# Patient Record
Sex: MALE | Race: WHITE | URBAN - METROPOLITAN AREA
[De-identification: names, ages, dates, MRNs, and addresses within clinical notes are randomized per-mention and may not be internally consistent; named-entity substitution may affect disease eponyms.]

---

## 2019-07-23 ENCOUNTER — OUTPATIENT (OUTPATIENT)
Dept: OUTPATIENT SERVICES | Facility: HOSPITAL | Age: 59
LOS: 1 days | Discharge: HOME | End: 2019-07-23

## 2019-07-23 DIAGNOSIS — Z98.890 OTHER SPECIFIED POSTPROCEDURAL STATES: Chronic | ICD-10-CM

## 2019-07-24 DIAGNOSIS — G47.33 OBSTRUCTIVE SLEEP APNEA (ADULT) (PEDIATRIC): ICD-10-CM

## 2019-09-16 ENCOUNTER — EMERGENCY (EMERGENCY)
Facility: HOSPITAL | Age: 59
LOS: 0 days | Discharge: HOME | End: 2019-09-16
Attending: EMERGENCY MEDICINE | Admitting: EMERGENCY MEDICINE
Payer: COMMERCIAL

## 2019-09-16 VITALS
OXYGEN SATURATION: 98 % | TEMPERATURE: 99 F | RESPIRATION RATE: 18 BRPM | WEIGHT: 265 LBS | HEART RATE: 72 BPM | SYSTOLIC BLOOD PRESSURE: 136 MMHG | DIASTOLIC BLOOD PRESSURE: 66 MMHG

## 2019-09-16 DIAGNOSIS — Z98.890 OTHER SPECIFIED POSTPROCEDURAL STATES: Chronic | ICD-10-CM

## 2019-09-16 DIAGNOSIS — R11.0 NAUSEA: ICD-10-CM

## 2019-09-16 DIAGNOSIS — R10.32 LEFT LOWER QUADRANT PAIN: ICD-10-CM

## 2019-09-16 DIAGNOSIS — R10.9 UNSPECIFIED ABDOMINAL PAIN: ICD-10-CM

## 2019-09-16 LAB
ALBUMIN SERPL ELPH-MCNC: 4.6 G/DL — SIGNIFICANT CHANGE UP (ref 3.5–5.2)
ALP SERPL-CCNC: 80 U/L — SIGNIFICANT CHANGE UP (ref 30–115)
ALT FLD-CCNC: 20 U/L — SIGNIFICANT CHANGE UP (ref 0–41)
ANION GAP SERPL CALC-SCNC: 16 MMOL/L — HIGH (ref 7–14)
APPEARANCE UR: CLEAR — SIGNIFICANT CHANGE UP
AST SERPL-CCNC: 25 U/L — SIGNIFICANT CHANGE UP (ref 0–41)
BACTERIA # UR AUTO: ABNORMAL
BASOPHILS # BLD AUTO: 0.04 K/UL — SIGNIFICANT CHANGE UP (ref 0–0.2)
BASOPHILS NFR BLD AUTO: 0.5 % — SIGNIFICANT CHANGE UP (ref 0–1)
BILIRUB SERPL-MCNC: 0.8 MG/DL — SIGNIFICANT CHANGE UP (ref 0.2–1.2)
BILIRUB UR-MCNC: ABNORMAL
BUN SERPL-MCNC: 15 MG/DL — SIGNIFICANT CHANGE UP (ref 10–20)
CALCIUM SERPL-MCNC: 10.1 MG/DL — SIGNIFICANT CHANGE UP (ref 8.5–10.1)
CHLORIDE SERPL-SCNC: 102 MMOL/L — SIGNIFICANT CHANGE UP (ref 98–110)
CO2 SERPL-SCNC: 23 MMOL/L — SIGNIFICANT CHANGE UP (ref 17–32)
COD CRY URNS QL: ABNORMAL
COLOR SPEC: SIGNIFICANT CHANGE UP
COMMENT - URINE: SIGNIFICANT CHANGE UP
CREAT SERPL-MCNC: 1.6 MG/DL — HIGH (ref 0.7–1.5)
DIFF PNL FLD: NEGATIVE — SIGNIFICANT CHANGE UP
EOSINOPHIL # BLD AUTO: 0.08 K/UL — SIGNIFICANT CHANGE UP (ref 0–0.7)
EOSINOPHIL NFR BLD AUTO: 1 % — SIGNIFICANT CHANGE UP (ref 0–8)
EPI CELLS # UR: ABNORMAL /HPF
GLUCOSE SERPL-MCNC: 92 MG/DL — SIGNIFICANT CHANGE UP (ref 70–99)
GLUCOSE UR QL: NEGATIVE MG/DL — SIGNIFICANT CHANGE UP
HCT VFR BLD CALC: 46 % — SIGNIFICANT CHANGE UP (ref 42–52)
HGB BLD-MCNC: 15.2 G/DL — SIGNIFICANT CHANGE UP (ref 14–18)
HYALINE CASTS # UR AUTO: ABNORMAL /LPF
IMM GRANULOCYTES NFR BLD AUTO: 0.2 % — SIGNIFICANT CHANGE UP (ref 0.1–0.3)
KETONES UR-MCNC: 160
LEUKOCYTE ESTERASE UR-ACNC: ABNORMAL
LYMPHOCYTES # BLD AUTO: 1.82 K/UL — SIGNIFICANT CHANGE UP (ref 1.2–3.4)
LYMPHOCYTES # BLD AUTO: 21.8 % — SIGNIFICANT CHANGE UP (ref 20.5–51.1)
MCHC RBC-ENTMCNC: 29.7 PG — SIGNIFICANT CHANGE UP (ref 27–31)
MCHC RBC-ENTMCNC: 33 G/DL — SIGNIFICANT CHANGE UP (ref 32–37)
MCV RBC AUTO: 90 FL — SIGNIFICANT CHANGE UP (ref 80–94)
MONOCYTES # BLD AUTO: 0.53 K/UL — SIGNIFICANT CHANGE UP (ref 0.1–0.6)
MONOCYTES NFR BLD AUTO: 6.4 % — SIGNIFICANT CHANGE UP (ref 1.7–9.3)
NEUTROPHILS # BLD AUTO: 5.85 K/UL — SIGNIFICANT CHANGE UP (ref 1.4–6.5)
NEUTROPHILS NFR BLD AUTO: 70.1 % — SIGNIFICANT CHANGE UP (ref 42.2–75.2)
NITRITE UR-MCNC: NEGATIVE — SIGNIFICANT CHANGE UP
NRBC # BLD: 0 /100 WBCS — SIGNIFICANT CHANGE UP (ref 0–0)
PH UR: 5.5 — SIGNIFICANT CHANGE UP (ref 5–8)
PLATELET # BLD AUTO: 285 K/UL — SIGNIFICANT CHANGE UP (ref 130–400)
POTASSIUM SERPL-MCNC: 4.1 MMOL/L — SIGNIFICANT CHANGE UP (ref 3.5–5)
POTASSIUM SERPL-SCNC: 4.1 MMOL/L — SIGNIFICANT CHANGE UP (ref 3.5–5)
PROT SERPL-MCNC: 7.3 G/DL — SIGNIFICANT CHANGE UP (ref 6–8)
PROT UR-MCNC: 100 MG/DL
RBC # BLD: 5.11 M/UL — SIGNIFICANT CHANGE UP (ref 4.7–6.1)
RBC # FLD: 12.2 % — SIGNIFICANT CHANGE UP (ref 11.5–14.5)
RBC CASTS # UR COMP ASSIST: SIGNIFICANT CHANGE UP /HPF
SODIUM SERPL-SCNC: 141 MMOL/L — SIGNIFICANT CHANGE UP (ref 135–146)
SP GR SPEC: >=1.03 (ref 1.01–1.03)
UROBILINOGEN FLD QL: 1 MG/DL (ref 0.2–0.2)
WBC # BLD: 8.34 K/UL — SIGNIFICANT CHANGE UP (ref 4.8–10.8)
WBC # FLD AUTO: 8.34 K/UL — SIGNIFICANT CHANGE UP (ref 4.8–10.8)
WBC UR QL: SIGNIFICANT CHANGE UP /HPF

## 2019-09-16 PROCEDURE — 74177 CT ABD & PELVIS W/CONTRAST: CPT | Mod: 26

## 2019-09-16 PROCEDURE — 99285 EMERGENCY DEPT VISIT HI MDM: CPT

## 2019-09-16 RX ORDER — SIMVASTATIN 20 MG/1
1 TABLET, FILM COATED ORAL
Qty: 0 | Refills: 0 | DISCHARGE

## 2019-09-16 RX ORDER — SODIUM CHLORIDE 9 MG/ML
1000 INJECTION INTRAMUSCULAR; INTRAVENOUS; SUBCUTANEOUS
Refills: 0 | Status: DISCONTINUED | OUTPATIENT
Start: 2019-09-16 | End: 2019-09-16

## 2019-09-16 RX ORDER — ONDANSETRON 8 MG/1
4 TABLET, FILM COATED ORAL ONCE
Refills: 0 | Status: COMPLETED | OUTPATIENT
Start: 2019-09-16 | End: 2019-09-16

## 2019-09-16 RX ORDER — MORPHINE SULFATE 50 MG/1
4 CAPSULE, EXTENDED RELEASE ORAL ONCE
Refills: 0 | Status: DISCONTINUED | OUTPATIENT
Start: 2019-09-16 | End: 2019-09-16

## 2019-09-16 RX ORDER — SODIUM CHLORIDE 9 MG/ML
1000 INJECTION INTRAMUSCULAR; INTRAVENOUS; SUBCUTANEOUS ONCE
Refills: 0 | Status: COMPLETED | OUTPATIENT
Start: 2019-09-16 | End: 2019-09-16

## 2019-09-16 RX ADMIN — MORPHINE SULFATE 4 MILLIGRAM(S): 50 CAPSULE, EXTENDED RELEASE ORAL at 17:36

## 2019-09-16 RX ADMIN — ONDANSETRON 4 MILLIGRAM(S): 8 TABLET, FILM COATED ORAL at 17:36

## 2019-09-16 RX ADMIN — SODIUM CHLORIDE 1000 MILLILITER(S): 9 INJECTION INTRAMUSCULAR; INTRAVENOUS; SUBCUTANEOUS at 18:48

## 2019-09-16 RX ADMIN — MORPHINE SULFATE 4 MILLIGRAM(S): 50 CAPSULE, EXTENDED RELEASE ORAL at 18:45

## 2019-09-16 RX ADMIN — MORPHINE SULFATE 4 MILLIGRAM(S): 50 CAPSULE, EXTENDED RELEASE ORAL at 19:52

## 2019-09-16 RX ADMIN — SODIUM CHLORIDE 200 MILLILITER(S): 9 INJECTION INTRAMUSCULAR; INTRAVENOUS; SUBCUTANEOUS at 19:52

## 2019-09-16 RX ADMIN — SODIUM CHLORIDE 1000 MILLILITER(S): 9 INJECTION INTRAMUSCULAR; INTRAVENOUS; SUBCUTANEOUS at 19:54

## 2019-09-16 NOTE — ED PROVIDER NOTE - PROGRESS NOTE DETAILS
60 y/o M PMH noted presents with abdominal pain x1 month. Pt completed a course of Cipro and Flagyl 2 weeks ago, which helped resolved the pain but pain returned. Pt started 2nd course of Cipro and Flagyl on Friday, as well as a liquid diet. Pt has had NBNB watery stools. Pt states he has chills and nausea today. Pt denies fever, CP, vomiting, and urinary symptoms. On exam pt in NAD, AAO x3, PERRL, EOMI, no lad, neck supple, OP clear, MMM, Lungs CTA B/L, no wrr, no mur, Abd is soft, + BS, (+) TTP LLQ  ND, no edema, good ROM x all ext, no rash, steady gait results discussed will dc

## 2019-09-16 NOTE — ED PROVIDER NOTE - PATIENT PORTAL LINK FT
You can access the FollowMyHealth Patient Portal offered by [facility] by registering at the following website: [email or web address]. By joining [employer]’s FollowMyHealth portal, you will also be able to view your health information using other applications (apps) compatible with our system.

## 2019-09-16 NOTE — ED PROVIDER NOTE - CLINICAL SUMMARY MEDICAL DECISION MAKING FREE TEXT BOX
Results reviewed and d/w patient and family.  Pt with uncomplicated diverticulitis, afebrile, nml WBC count.  In my opinion he can cont abx ads prescribed by GI, will need to follow p ith GI , Pt will return if any worsening symptoms or concerns.

## 2019-09-16 NOTE — ED PROVIDER NOTE - ATTENDING CONTRIBUTION TO CARE
60 y/o M PMH noted presents with abdominal pain x1 month. Pt completed a course of Cipro and Flagyl 2 weeks ago, which resolved the pain but pain returned. Pt started 2nd course of Cipro and Flagyl on Friday, as well as a liquid diet after being seen by his GI.  Pt scheduled for CT tomorrow, but fet like he could not wait.  Pt has been having loose stools. Pt states he has chills and nausea today. Pt denies fever, CP, vomiting, and urinary symptoms. On exam pt in NAD, AAO x3, PERRL, no lad, neck supple, OP clear,  Lungs CTA B/L, no wrr, Abd is soft, + BS, (+) TTP LLQ  ND, no edema, good ROM x all ext, no rash,

## 2019-09-16 NOTE — ED PROVIDER NOTE - NSFOLLOWUPINSTRUCTIONS_ED_ALL_ED_FT
Patient to be discharged from ED. Any available test results were discussed with patient and/or family. Verbal instructions given, including instructions to return to ED immediately for any new, worsening, or concerning symptoms. Patient endorsed understanding. Written discharge instructions additionally given, including follow-up plan.    Diverticulitis    Diverticulitis is inflammation or infection of small pouches in your colon that form when you HAVE a condition called diverticulosis. This condition can range from mild to severe potentially leading to perforation or obstructions of your colon. Symptoms include abdominal pain, fever/chills, nausea, vomiting, diarrhea, constipation, or blood in your stool. If you were prescribed an antibiotic medicine, take it as told by your health care provider. Do not stop taking the antibiotic even if you start to feel better.    SEEK IMMEDIATE MEDICAL CARE IF YOU HAVE ANY OF THE FOLLOWING SYMPTOMS: worsening abdominal pain, high fever, inability to hold down liquids or medication, black or bloody stools, inability to pass gas, lightheadedness/dizziness, or a change in mental status.

## 2019-09-16 NOTE — ED PROVIDER NOTE - OBJECTIVE STATEMENT
this is 58 yo male presents to ed for evaluation of abdominal pain. patient with history of diverticulitis. patient states has episode in august and was treated. patient states this started again

## 2019-09-16 NOTE — ED PROVIDER NOTE - NS ED ROS FT
Review of Systems:  	•	CONSTITUTIONAL - no fever, no diaphoresis, no chills  	•	SKIN - no rash  	•	HEMATOLOGIC - no bleeding, no bruising  	•	EYES - no eye pain, no blurry vision  	•	ENT - no change in hearing, no sore throat, no ear pain or tinnitus  	•	RESPIRATORY - no shortness of breath, no cough  	•	CARDIAC - no chest pain, no palpitations  	•	GI - abd pain, nausea, no vomiting, no diarrhea, no constipation  	•	GENITO-URINARY - no discharge, no dysuria; no hematuria, no increased urinary frequency  	•	MUSCULOSKELETAL - no joint paint, no swelling, no redness  	•	NEUROLOGIC - no weakness, no headache, no paresthesias, no LOC  	•	PSYCH - no anxiety, non suicidal, non homicidal, no hallucination, no depression

## 2019-09-16 NOTE — ED PROVIDER NOTE - PHYSICAL EXAMINATION
--EXAM--  VITAL SIGNS: I have reviewed vs documented at present.  CONSTITUTIONAL: Well-developed; well-nourished; in no acute distress.   SKIN: Warm and dry, no acute rash.   HEAD: Normocephalic; atraumatic.  EYES: PERRL, EOM intact; conjunctiva and sclera clear. No nystagmus.  ENT: No nasal discharge; airway clear.  NECK: Supple; non tender.  CARD: S1, S2, Regular rate and rhythm.   RESP: No wheezes, rales or rhonchi.  ABD: Normal bowel sounds; soft; non-distended; tenderness LLQ  EXT: Normal ROM.   NEURO: Alert, oriented, grossly unremarkable. Strength 5/5 in all extremities. Sensation intact throughout.  PSYCH: Cooperative, appropriate.

## 2019-09-16 NOTE — ED PROVIDER NOTE - CARE PROVIDER_API CALL
Moody Hager)  Gastroenterology; Internal Medicine  [address] [place] [address]  Phone: [phone]  Fax: [phone]  Follow Up Time:

## 2019-11-03 ENCOUNTER — TRANSCRIPTION ENCOUNTER (OUTPATIENT)
Age: 59
End: 2019-11-03

## 2021-11-03 ENCOUNTER — EMERGENCY (EMERGENCY)
Facility: HOSPITAL | Age: 61
LOS: 0 days | Discharge: HOME | End: 2021-11-04
Attending: EMERGENCY MEDICINE | Admitting: EMERGENCY MEDICINE
Payer: COMMERCIAL

## 2021-11-03 VITALS
HEART RATE: 70 BPM | SYSTOLIC BLOOD PRESSURE: 183 MMHG | TEMPERATURE: 99 F | OXYGEN SATURATION: 100 % | DIASTOLIC BLOOD PRESSURE: 96 MMHG | HEIGHT: 74 IN | RESPIRATION RATE: 20 BRPM | WEIGHT: 250 LBS

## 2021-11-03 DIAGNOSIS — R10.32 LEFT LOWER QUADRANT PAIN: ICD-10-CM

## 2021-11-03 DIAGNOSIS — R10.9 UNSPECIFIED ABDOMINAL PAIN: ICD-10-CM

## 2021-11-03 DIAGNOSIS — K62.5 HEMORRHAGE OF ANUS AND RECTUM: ICD-10-CM

## 2021-11-03 DIAGNOSIS — E78.5 HYPERLIPIDEMIA, UNSPECIFIED: ICD-10-CM

## 2021-11-03 DIAGNOSIS — Z98.890 OTHER SPECIFIED POSTPROCEDURAL STATES: Chronic | ICD-10-CM

## 2021-11-03 DIAGNOSIS — R00.1 BRADYCARDIA, UNSPECIFIED: ICD-10-CM

## 2021-11-03 DIAGNOSIS — Z20.822 CONTACT WITH AND (SUSPECTED) EXPOSURE TO COVID-19: ICD-10-CM

## 2021-11-03 DIAGNOSIS — I10 ESSENTIAL (PRIMARY) HYPERTENSION: ICD-10-CM

## 2021-11-03 LAB
ALBUMIN SERPL ELPH-MCNC: 4.5 G/DL — SIGNIFICANT CHANGE UP (ref 3.5–5.2)
ALP SERPL-CCNC: 86 U/L — SIGNIFICANT CHANGE UP (ref 30–115)
ALT FLD-CCNC: 40 U/L — SIGNIFICANT CHANGE UP (ref 0–41)
ANION GAP SERPL CALC-SCNC: 11 MMOL/L — SIGNIFICANT CHANGE UP (ref 7–14)
APPEARANCE UR: CLEAR — SIGNIFICANT CHANGE UP
AST SERPL-CCNC: 26 U/L — SIGNIFICANT CHANGE UP (ref 0–41)
BASOPHILS # BLD AUTO: 0.08 K/UL — SIGNIFICANT CHANGE UP (ref 0–0.2)
BASOPHILS NFR BLD AUTO: 0.8 % — SIGNIFICANT CHANGE UP (ref 0–1)
BILIRUB DIRECT SERPL-MCNC: <0.2 MG/DL — SIGNIFICANT CHANGE UP (ref 0–0.2)
BILIRUB INDIRECT FLD-MCNC: >0.3 MG/DL — SIGNIFICANT CHANGE UP (ref 0.2–1.2)
BILIRUB SERPL-MCNC: 0.5 MG/DL — SIGNIFICANT CHANGE UP (ref 0.2–1.2)
BILIRUB UR-MCNC: NEGATIVE — SIGNIFICANT CHANGE UP
BLD GP AB SCN SERPL QL: SIGNIFICANT CHANGE UP
BUN SERPL-MCNC: 19 MG/DL — SIGNIFICANT CHANGE UP (ref 10–20)
CALCIUM SERPL-MCNC: 9.4 MG/DL — SIGNIFICANT CHANGE UP (ref 8.5–10.1)
CHLORIDE SERPL-SCNC: 103 MMOL/L — SIGNIFICANT CHANGE UP (ref 98–110)
CO2 SERPL-SCNC: 26 MMOL/L — SIGNIFICANT CHANGE UP (ref 17–32)
COLOR SPEC: YELLOW — SIGNIFICANT CHANGE UP
CREAT SERPL-MCNC: 1.2 MG/DL — SIGNIFICANT CHANGE UP (ref 0.7–1.5)
DIFF PNL FLD: NEGATIVE — SIGNIFICANT CHANGE UP
EOSINOPHIL # BLD AUTO: 0.3 K/UL — SIGNIFICANT CHANGE UP (ref 0–0.7)
EOSINOPHIL NFR BLD AUTO: 3.2 % — SIGNIFICANT CHANGE UP (ref 0–8)
GLUCOSE SERPL-MCNC: 85 MG/DL — SIGNIFICANT CHANGE UP (ref 70–99)
GLUCOSE UR QL: NEGATIVE MG/DL — SIGNIFICANT CHANGE UP
HCT VFR BLD CALC: 44.4 % — SIGNIFICANT CHANGE UP (ref 42–52)
HGB BLD-MCNC: 14.6 G/DL — SIGNIFICANT CHANGE UP (ref 14–18)
IMM GRANULOCYTES NFR BLD AUTO: 0.1 % — SIGNIFICANT CHANGE UP (ref 0.1–0.3)
KETONES UR-MCNC: NEGATIVE — SIGNIFICANT CHANGE UP
LEUKOCYTE ESTERASE UR-ACNC: NEGATIVE — SIGNIFICANT CHANGE UP
LIDOCAIN IGE QN: 48 U/L — SIGNIFICANT CHANGE UP (ref 7–60)
LYMPHOCYTES # BLD AUTO: 3.17 K/UL — SIGNIFICANT CHANGE UP (ref 1.2–3.4)
LYMPHOCYTES # BLD AUTO: 33.5 % — SIGNIFICANT CHANGE UP (ref 20.5–51.1)
MCHC RBC-ENTMCNC: 30.2 PG — SIGNIFICANT CHANGE UP (ref 27–31)
MCHC RBC-ENTMCNC: 32.9 G/DL — SIGNIFICANT CHANGE UP (ref 32–37)
MCV RBC AUTO: 91.9 FL — SIGNIFICANT CHANGE UP (ref 80–94)
MONOCYTES # BLD AUTO: 0.74 K/UL — HIGH (ref 0.1–0.6)
MONOCYTES NFR BLD AUTO: 7.8 % — SIGNIFICANT CHANGE UP (ref 1.7–9.3)
NEUTROPHILS # BLD AUTO: 5.15 K/UL — SIGNIFICANT CHANGE UP (ref 1.4–6.5)
NEUTROPHILS NFR BLD AUTO: 54.6 % — SIGNIFICANT CHANGE UP (ref 42.2–75.2)
NITRITE UR-MCNC: NEGATIVE — SIGNIFICANT CHANGE UP
NRBC # BLD: 0 /100 WBCS — SIGNIFICANT CHANGE UP (ref 0–0)
PH UR: 6.5 — SIGNIFICANT CHANGE UP (ref 5–8)
PLATELET # BLD AUTO: 267 K/UL — SIGNIFICANT CHANGE UP (ref 130–400)
POTASSIUM SERPL-MCNC: 4 MMOL/L — SIGNIFICANT CHANGE UP (ref 3.5–5)
POTASSIUM SERPL-SCNC: 4 MMOL/L — SIGNIFICANT CHANGE UP (ref 3.5–5)
PROT SERPL-MCNC: 6.8 G/DL — SIGNIFICANT CHANGE UP (ref 6–8)
PROT UR-MCNC: NEGATIVE MG/DL — SIGNIFICANT CHANGE UP
RBC # BLD: 4.83 M/UL — SIGNIFICANT CHANGE UP (ref 4.7–6.1)
RBC # FLD: 12.7 % — SIGNIFICANT CHANGE UP (ref 11.5–14.5)
SARS-COV-2 RNA SPEC QL NAA+PROBE: SIGNIFICANT CHANGE UP
SODIUM SERPL-SCNC: 140 MMOL/L — SIGNIFICANT CHANGE UP (ref 135–146)
SP GR SPEC: 1.02 — SIGNIFICANT CHANGE UP (ref 1.01–1.03)
UROBILINOGEN FLD QL: 0.2 MG/DL — SIGNIFICANT CHANGE UP
WBC # BLD: 9.45 K/UL — SIGNIFICANT CHANGE UP (ref 4.8–10.8)
WBC # FLD AUTO: 9.45 K/UL — SIGNIFICANT CHANGE UP (ref 4.8–10.8)

## 2021-11-03 PROCEDURE — 99285 EMERGENCY DEPT VISIT HI MDM: CPT

## 2021-11-03 PROCEDURE — 71045 X-RAY EXAM CHEST 1 VIEW: CPT | Mod: 26

## 2021-11-03 PROCEDURE — 93010 ELECTROCARDIOGRAM REPORT: CPT

## 2021-11-03 RX ORDER — ONDANSETRON 8 MG/1
4 TABLET, FILM COATED ORAL ONCE
Refills: 0 | Status: COMPLETED | OUTPATIENT
Start: 2021-11-03 | End: 2021-11-03

## 2021-11-03 RX ORDER — IOHEXOL 300 MG/ML
30 INJECTION, SOLUTION INTRAVENOUS ONCE
Refills: 0 | Status: COMPLETED | OUTPATIENT
Start: 2021-11-03 | End: 2021-11-03

## 2021-11-03 RX ORDER — SODIUM CHLORIDE 9 MG/ML
1000 INJECTION INTRAMUSCULAR; INTRAVENOUS; SUBCUTANEOUS ONCE
Refills: 0 | Status: COMPLETED | OUTPATIENT
Start: 2021-11-03 | End: 2021-11-03

## 2021-11-03 RX ORDER — MORPHINE SULFATE 50 MG/1
6 CAPSULE, EXTENDED RELEASE ORAL ONCE
Refills: 0 | Status: DISCONTINUED | OUTPATIENT
Start: 2021-11-03 | End: 2021-11-03

## 2021-11-03 RX ADMIN — MORPHINE SULFATE 6 MILLIGRAM(S): 50 CAPSULE, EXTENDED RELEASE ORAL at 22:02

## 2021-11-03 RX ADMIN — ONDANSETRON 4 MILLIGRAM(S): 8 TABLET, FILM COATED ORAL at 22:02

## 2021-11-03 RX ADMIN — SODIUM CHLORIDE 1000 MILLILITER(S): 9 INJECTION INTRAMUSCULAR; INTRAVENOUS; SUBCUTANEOUS at 22:02

## 2021-11-03 RX ADMIN — IOHEXOL 30 MILLILITER(S): 300 INJECTION, SOLUTION INTRAVENOUS at 22:02

## 2021-11-03 NOTE — ED PROVIDER NOTE - ATTENDING CONTRIBUTION TO CARE
61yM diverticulitis p/w rectal bleeding - pt just finished abx 2d ago for diverticulitis and still having intermittent LLQ abd pain.  Pt presents today for rectal bleeding - toilet bowl w/ bright red blood tonight.  No fevers.  Not on a/c.

## 2021-11-03 NOTE — ED PROVIDER NOTE - OBJECTIVE STATEMENT
61 year old male past medical history of diverticulitis with resection and recent infection on amox and flagyl comes to emergency room for abdominal pain and rectal bleeding. patient sttes that he was having BM and after saw blood in the toliet and came to emergency room to make sure he didn't have infection again as he just finished abx.

## 2021-11-03 NOTE — ED PROVIDER NOTE - CLINICAL SUMMARY MEDICAL DECISION MAKING FREE TEXT BOX
61yM diverticular disease s/p resection p/w LLQ abd pain and rectal bleeding 2d after completing abx for diverticulitis.  H/H stable.  CT w/o acute pathology.  Pain treated and abd soft/benign on assessment.  Recommend supportive care, close o/p GI/gen surg f/u, return precautions.

## 2021-11-03 NOTE — ED PROVIDER NOTE - PROGRESS NOTE DETAILS
Pt with no further bloody BM. Results d/w patient and family and copies of results provided.  Pt instructed to return if any worsening symptoms or concerns.  Discussed with patient follow up and care plan. They verbalize understanding all discussed and all questions answered..

## 2021-11-03 NOTE — ED ADULT TRIAGE NOTE - CHIEF COMPLAINT QUOTE
I have diverticulitis for several years.  I just finished amoxicillin and metronidazole and today I had a whole bowl of blood when I went to the bathroom.

## 2021-11-03 NOTE — ED PROVIDER NOTE - PHYSICAL EXAMINATION
Physical Exam    Vital Signs: I have reviewed the initial vital signs.  Constitutional: well-nourished, appears stated age, no acute distress  Eyes: Conjunctiva pink, Sclera clear, PERRLA, EOMI.  Cardiovascular: S1 and S2, regular rate, regular rhythm, well-perfused extremities, radial pulses equal and 2+  Respiratory: unlabored respiratory effort, clear to auscultation bilaterally no wheezing, rales and rhonchi  Gastrointestinal: soft, LLQ Tenderness , no pulsatile mass, normal bowl sounds  Musculoskeletal: supple neck, no lower extremity edema, no midline tenderness  Integumentary: warm, dry, no rash  Neurologic: awake, alert, cranial nerves II-XII grossly intact, extremities’ motor and sensory functions grossly intact  Psychiatric: appropriate mood, appropriate affect

## 2021-11-03 NOTE — ED PROVIDER NOTE - NSFOLLOWUPINSTRUCTIONS_ED_ALL_ED_FT
Follow up with your primary care doctor and your GI doctor in 1-2 days     Acute Abdominal Pain    WHAT YOU NEED TO KNOW:    The cause of your abdominal pain may not be found. If a cause is found, treatment will depend on what the cause is.     DISCHARGE INSTRUCTIONS:    Return to the emergency department if:     You vomit blood or cannot stop vomiting.      You have blood in your bowel movement or it looks like tar.       You have bleeding from your rectum.       Your abdomen is larger than usual, more painful, and hard.       You have severe pain in your abdomen.       You stop passing gas and having bowel movements.       You feel weak, dizzy, or faint.    Contact your healthcare provider if:     You have a fever.      You have new signs and symptoms.      Your symptoms do not get better with treatment.       You have questions or concerns about your condition or care.    Medicines may be given to decrease pain, treat an infection, and manage your symptoms. Take your medicine as directed. Call your healthcare provider if you think your medicine is not helping or if you have side effects. Tell him if you are allergic to any medicine. Keep a list of the medicines, vitamins, and herbs you take. Include the amounts, and when and why you take them. Bring the list or the pill bottles to follow-up visits. Carry your medicine list with you in case of an emergency.    Manage your symptoms:     Apply heat on your abdomen for 20 to 30 minutes every 2 hours for as many days as directed. Heat helps decrease pain and muscle spasms.       Manage your stress. Stress may cause abdominal pain. Your healthcare provider may recommend relaxation techniques and deep breathing exercises to help decrease your stress. Your healthcare provider may recommend you talk to someone about your stress or anxiety, such as a counselor or a trusted friend. Get plenty of sleep and exercise regularly.       Limit or do not drink alcohol. Alcohol can make your abdominal pain worse. Ask your healthcare provider if it is safe for you to drink alcohol. Also ask how much is safe for you to drink.       Do not smoke. Nicotine and other chemicals in cigarettes can damage your esophagus and stomach. Ask your healthcare provider for information if you currently smoke and need help to quit. E-cigarettes or smokeless tobacco still contain nicotine. Talk to your healthcare provider before you use these products.     Make changes to the food you eat as directed: Do not eat foods that cause abdominal pain or other symptoms. Eat small meals more often.     Eat more high-fiber foods if you are constipated. High-fiber foods include fruits, vegetables, whole-grain foods, and legumes.       Do not eat foods that cause gas if you have bloating. Examples include broccoli, cabbage, and cauliflower. Do not drink soda or carbonated drinks, because these may also cause gas.       Do not eat foods or drinks that contain sorbitol or fructose if you have diarrhea and bloating. Some examples are fruit juices, candy, jelly, and sugar-free gum.       Do not eat high-fat foods, such as fried foods, cheeseburgers, hot dogs, and desserts.      Limit or do not drink caffeine. Caffeine may make symptoms, such as heart burn or nausea, worse.       Drink plenty of liquids to prevent dehydration from diarrhea or vomiting. Ask your healthcare provider how much liquid to drink each day and which liquids are best for you.     Follow up with your healthcare provider as directed: Write down your questions so you remember to ask them during your visits.       © Copyright [employer] 2019 All illustrations and images included in CareNotes are the copyrighted property of [employer] or [employer]       Rectal Bleeding    WHAT YOU NEED TO KNOW:    Rectal bleeding can be caused by constipation, hemorrhoids, or anal fissures. It may also be caused by polyps, tumors, or medical conditions, such as colitis or diverticulitis.    DISCHARGE INSTRUCTIONS:    Medicines:     Pain medicine: You may be given medicine to take away or decrease pain. Do not wait until the pain is severe before you take your medicine.      Iron supplement: Iron helps your body make more red blood cells.       Steroids: This medicine decreases inflammation in your rectum. It may be applied as a cream, ointment, or lotion.      Take your medicine as directed. Contact your healthcare provider if you think your medicine is not helping or if you have side effects. Tell him of her if you are allergic to any medicine. Keep a list of the medicines, vitamins, and herbs you take. Include the amounts, and when and why you take them. Bring the list or the pill bottles to follow-up visits. Carry your medicine list with you in case of an emergency.    Follow up with your healthcare provider as directed: Write down your questions so you remember to ask them during your visits.     Drink liquids as directed: Ask your healthcare provider how much liquid to drink each day and which liquids are best for you. This will help prevent dehydration and constipation.    Contact your healthcare provider if:     You have a fever.      Your rectal bleeding stopped for a time, but has started again.       You have nausea.      You have cold, sweaty, pale skin.      You have changes in your bowel movements, such as diarrhea.      You have questions or concerns about your condition or care.    Return to the emergency department if:     You are breathing faster than usual.      You are dizzy, lightheaded, or feel faint.      You are confused or cannot think clearly.      You urinate less than usual or not at all.      Your rectal bleeding is constant or heavy.      You have severe abdominal pain or cramping.         © Copyright [employer] 2019 All illustrations and images included in CareNotes are the copyrighted property of [employer] or [employer].

## 2021-11-03 NOTE — ED PROVIDER NOTE - PATIENT PORTAL LINK FT
You can access the FollowMyHealth Patient Portal offered by [facility] by registering at the following website: [email or web address]. By joining [employer]’s FollowMyHealth portal, you will also be able to view your health information using other applications (apps) compatible with our system.

## 2021-11-04 ENCOUNTER — INPATIENT (INPATIENT)
Facility: HOSPITAL | Age: 61
LOS: 6 days | Discharge: HOME | End: 2021-11-11
Attending: STUDENT IN AN ORGANIZED HEALTH CARE EDUCATION/TRAINING PROGRAM | Admitting: STUDENT IN AN ORGANIZED HEALTH CARE EDUCATION/TRAINING PROGRAM
Payer: COMMERCIAL

## 2021-11-04 VITALS
RESPIRATION RATE: 18 BRPM | HEART RATE: 66 BPM | SYSTOLIC BLOOD PRESSURE: 107 MMHG | DIASTOLIC BLOOD PRESSURE: 55 MMHG | HEIGHT: 74 IN | TEMPERATURE: 98 F | OXYGEN SATURATION: 100 % | WEIGHT: 279.99 LBS

## 2021-11-04 DIAGNOSIS — Z98.890 OTHER SPECIFIED POSTPROCEDURAL STATES: Chronic | ICD-10-CM

## 2021-11-04 PROCEDURE — 99291 CRITICAL CARE FIRST HOUR: CPT

## 2021-11-04 PROCEDURE — 99292 CRITICAL CARE ADDL 30 MIN: CPT

## 2021-11-04 PROCEDURE — 74177 CT ABD & PELVIS W/CONTRAST: CPT | Mod: 26,MA

## 2021-11-04 NOTE — ED ADULT NURSE NOTE - ED STAT RN HANDOFF DETAILS
Phone report given to EMS team, pt aaox3, respirations even , easy and nonlabored. Family at bedside.  1st unit PRBCs completed, 2nd unit actively transfusing with no s/s of infiltrate. report given to rn on unit

## 2021-11-04 NOTE — ED ADULT NURSE NOTE - NSFALLRSKINDICATORS_ED_ALL_ED
lumbosacral radiculopathy (I.e., primarily L5/S1 myotomal distribution) with mild ongoing and chronic denervation of mostly a moderately severe degree. 2.  There is no evidence of a chronic large fiber sensorimotor peripheral polyneuropathy. 3.  A small fiber neuropathy cannot be assessed by means of electrodiagnostic testing. 4.  Recommended to be completed includes a lumbar spine MRI study for evaluation of lumbar degenerative disc disease, lumbar spinal stenosis. Clinical correlation is recommended. Prior EMG study: None. Technologist:   Physician: Pat Gauthier MD    Nerve conduction studies and electromyography were performed according to our laboratory policies and procedures which can be provided upon request. All abnormal values are identified in the table.  Laboratory normal values can also be provided upon request.       Cc: MD Edward Chaudhry MD
no

## 2021-11-05 ENCOUNTER — TRANSCRIPTION ENCOUNTER (OUTPATIENT)
Age: 61
End: 2021-11-05

## 2021-11-05 DIAGNOSIS — Z90.49 ACQUIRED ABSENCE OF OTHER SPECIFIED PARTS OF DIGESTIVE TRACT: Chronic | ICD-10-CM

## 2021-11-05 LAB
ALBUMIN SERPL ELPH-MCNC: 3 G/DL — LOW (ref 3.5–5.2)
ALBUMIN SERPL ELPH-MCNC: 3.7 G/DL — SIGNIFICANT CHANGE UP (ref 3.5–5.2)
ALP SERPL-CCNC: 55 U/L — SIGNIFICANT CHANGE UP (ref 30–115)
ALP SERPL-CCNC: 77 U/L — SIGNIFICANT CHANGE UP (ref 30–115)
ALT FLD-CCNC: 21 U/L — SIGNIFICANT CHANGE UP (ref 0–41)
ALT FLD-CCNC: 33 U/L — SIGNIFICANT CHANGE UP (ref 0–41)
ANION GAP SERPL CALC-SCNC: 14 MMOL/L — SIGNIFICANT CHANGE UP (ref 7–14)
ANION GAP SERPL CALC-SCNC: 9 MMOL/L — SIGNIFICANT CHANGE UP (ref 7–14)
APTT BLD: 26.2 SEC — LOW (ref 27–39.2)
APTT BLD: 28.9 SEC — SIGNIFICANT CHANGE UP (ref 27–39.2)
AST SERPL-CCNC: 15 U/L — SIGNIFICANT CHANGE UP (ref 0–41)
AST SERPL-CCNC: 21 U/L — SIGNIFICANT CHANGE UP (ref 0–41)
BASOPHILS # BLD AUTO: 0.04 K/UL — SIGNIFICANT CHANGE UP (ref 0–0.2)
BASOPHILS # BLD AUTO: 0.05 K/UL — SIGNIFICANT CHANGE UP (ref 0–0.2)
BASOPHILS # BLD AUTO: 0.07 K/UL — SIGNIFICANT CHANGE UP (ref 0–0.2)
BASOPHILS # BLD AUTO: 0.09 K/UL — SIGNIFICANT CHANGE UP (ref 0–0.2)
BASOPHILS NFR BLD AUTO: 0.3 % — SIGNIFICANT CHANGE UP (ref 0–1)
BASOPHILS NFR BLD AUTO: 0.4 % — SIGNIFICANT CHANGE UP (ref 0–1)
BASOPHILS NFR BLD AUTO: 0.5 % — SIGNIFICANT CHANGE UP (ref 0–1)
BASOPHILS NFR BLD AUTO: 0.9 % — SIGNIFICANT CHANGE UP (ref 0–1)
BILIRUB SERPL-MCNC: 0.5 MG/DL — SIGNIFICANT CHANGE UP (ref 0.2–1.2)
BILIRUB SERPL-MCNC: 1.1 MG/DL — SIGNIFICANT CHANGE UP (ref 0.2–1.2)
BLD GP AB SCN SERPL QL: SIGNIFICANT CHANGE UP
BLD GP AB SCN SERPL QL: SIGNIFICANT CHANGE UP
BUN SERPL-MCNC: 16 MG/DL — SIGNIFICANT CHANGE UP (ref 10–20)
BUN SERPL-MCNC: 17 MG/DL — SIGNIFICANT CHANGE UP (ref 10–20)
CALCIUM SERPL-MCNC: 7.8 MG/DL — LOW (ref 8.5–10.1)
CALCIUM SERPL-MCNC: 9.1 MG/DL — SIGNIFICANT CHANGE UP (ref 8.5–10.1)
CHLORIDE SERPL-SCNC: 105 MMOL/L — SIGNIFICANT CHANGE UP (ref 98–110)
CHLORIDE SERPL-SCNC: 113 MMOL/L — HIGH (ref 98–110)
CO2 SERPL-SCNC: 17 MMOL/L — SIGNIFICANT CHANGE UP (ref 17–32)
CO2 SERPL-SCNC: 20 MMOL/L — SIGNIFICANT CHANGE UP (ref 17–32)
CREAT SERPL-MCNC: 1.1 MG/DL — SIGNIFICANT CHANGE UP (ref 0.7–1.5)
CREAT SERPL-MCNC: 1.2 MG/DL — SIGNIFICANT CHANGE UP (ref 0.7–1.5)
EOSINOPHIL # BLD AUTO: 0.02 K/UL — SIGNIFICANT CHANGE UP (ref 0–0.7)
EOSINOPHIL # BLD AUTO: 0.04 K/UL — SIGNIFICANT CHANGE UP (ref 0–0.7)
EOSINOPHIL # BLD AUTO: 0.12 K/UL — SIGNIFICANT CHANGE UP (ref 0–0.7)
EOSINOPHIL # BLD AUTO: 0.16 K/UL — SIGNIFICANT CHANGE UP (ref 0–0.7)
EOSINOPHIL NFR BLD AUTO: 0.2 % — SIGNIFICANT CHANGE UP (ref 0–8)
EOSINOPHIL NFR BLD AUTO: 0.3 % — SIGNIFICANT CHANGE UP (ref 0–8)
EOSINOPHIL NFR BLD AUTO: 1.2 % — SIGNIFICANT CHANGE UP (ref 0–8)
EOSINOPHIL NFR BLD AUTO: 1.4 % — SIGNIFICANT CHANGE UP (ref 0–8)
GLUCOSE SERPL-MCNC: 113 MG/DL — HIGH (ref 70–99)
GLUCOSE SERPL-MCNC: 120 MG/DL — HIGH (ref 70–99)
HCT VFR BLD CALC: 30.7 % — LOW (ref 42–52)
HCT VFR BLD CALC: 32.7 % — LOW (ref 42–52)
HCT VFR BLD CALC: 37.8 % — LOW (ref 42–52)
HCT VFR BLD CALC: 40 % — LOW (ref 42–52)
HGB BLD-MCNC: 10.1 G/DL — LOW (ref 14–18)
HGB BLD-MCNC: 11.2 G/DL — LOW (ref 14–18)
HGB BLD-MCNC: 12.4 G/DL — LOW (ref 14–18)
HGB BLD-MCNC: 13.2 G/DL — LOW (ref 14–18)
IMM GRANULOCYTES NFR BLD AUTO: 0.4 % — HIGH (ref 0.1–0.3)
IMM GRANULOCYTES NFR BLD AUTO: 0.5 % — HIGH (ref 0.1–0.3)
IMM GRANULOCYTES NFR BLD AUTO: 0.6 % — HIGH (ref 0.1–0.3)
IMM GRANULOCYTES NFR BLD AUTO: 0.6 % — HIGH (ref 0.1–0.3)
INR BLD: 1.19 RATIO — SIGNIFICANT CHANGE UP (ref 0.65–1.3)
INR BLD: 1.25 RATIO — SIGNIFICANT CHANGE UP (ref 0.65–1.3)
LYMPHOCYTES # BLD AUTO: 15 % — LOW (ref 20.5–51.1)
LYMPHOCYTES # BLD AUTO: 17.7 % — LOW (ref 20.5–51.1)
LYMPHOCYTES # BLD AUTO: 2.13 K/UL — SIGNIFICANT CHANGE UP (ref 1.2–3.4)
LYMPHOCYTES # BLD AUTO: 2.17 K/UL — SIGNIFICANT CHANGE UP (ref 1.2–3.4)
LYMPHOCYTES # BLD AUTO: 2.23 K/UL — SIGNIFICANT CHANGE UP (ref 1.2–3.4)
LYMPHOCYTES # BLD AUTO: 2.98 K/UL — SIGNIFICANT CHANGE UP (ref 1.2–3.4)
LYMPHOCYTES # BLD AUTO: 20.6 % — SIGNIFICANT CHANGE UP (ref 20.5–51.1)
LYMPHOCYTES # BLD AUTO: 26.5 % — SIGNIFICANT CHANGE UP (ref 20.5–51.1)
MAGNESIUM SERPL-MCNC: 1.6 MG/DL — LOW (ref 1.8–2.4)
MCHC RBC-ENTMCNC: 30 PG — SIGNIFICANT CHANGE UP (ref 27–31)
MCHC RBC-ENTMCNC: 30.1 PG — SIGNIFICANT CHANGE UP (ref 27–31)
MCHC RBC-ENTMCNC: 30.3 PG — SIGNIFICANT CHANGE UP (ref 27–31)
MCHC RBC-ENTMCNC: 30.4 PG — SIGNIFICANT CHANGE UP (ref 27–31)
MCHC RBC-ENTMCNC: 32.8 G/DL — SIGNIFICANT CHANGE UP (ref 32–37)
MCHC RBC-ENTMCNC: 32.9 G/DL — SIGNIFICANT CHANGE UP (ref 32–37)
MCHC RBC-ENTMCNC: 33 G/DL — SIGNIFICANT CHANGE UP (ref 32–37)
MCHC RBC-ENTMCNC: 34.3 G/DL — SIGNIFICANT CHANGE UP (ref 32–37)
MCV RBC AUTO: 87.9 FL — SIGNIFICANT CHANGE UP (ref 80–94)
MCV RBC AUTO: 91.3 FL — SIGNIFICANT CHANGE UP (ref 80–94)
MCV RBC AUTO: 92.2 FL — SIGNIFICANT CHANGE UP (ref 80–94)
MCV RBC AUTO: 92.2 FL — SIGNIFICANT CHANGE UP (ref 80–94)
MONOCYTES # BLD AUTO: 0.48 K/UL — SIGNIFICANT CHANGE UP (ref 0.1–0.6)
MONOCYTES # BLD AUTO: 0.69 K/UL — HIGH (ref 0.1–0.6)
MONOCYTES # BLD AUTO: 0.77 K/UL — HIGH (ref 0.1–0.6)
MONOCYTES # BLD AUTO: 0.79 K/UL — HIGH (ref 0.1–0.6)
MONOCYTES NFR BLD AUTO: 3.3 % — SIGNIFICANT CHANGE UP (ref 1.7–9.3)
MONOCYTES NFR BLD AUTO: 6.3 % — SIGNIFICANT CHANGE UP (ref 1.7–9.3)
MONOCYTES NFR BLD AUTO: 6.7 % — SIGNIFICANT CHANGE UP (ref 1.7–9.3)
MONOCYTES NFR BLD AUTO: 6.9 % — SIGNIFICANT CHANGE UP (ref 1.7–9.3)
NEUTROPHILS # BLD AUTO: 11.59 K/UL — HIGH (ref 1.4–6.5)
NEUTROPHILS # BLD AUTO: 7.22 K/UL — HIGH (ref 1.4–6.5)
NEUTROPHILS # BLD AUTO: 7.28 K/UL — HIGH (ref 1.4–6.5)
NEUTROPHILS # BLD AUTO: 9.46 K/UL — HIGH (ref 1.4–6.5)
NEUTROPHILS NFR BLD AUTO: 64.3 % — SIGNIFICANT CHANGE UP (ref 42.2–75.2)
NEUTROPHILS NFR BLD AUTO: 70.2 % — SIGNIFICANT CHANGE UP (ref 42.2–75.2)
NEUTROPHILS NFR BLD AUTO: 74.9 % — SIGNIFICANT CHANGE UP (ref 42.2–75.2)
NEUTROPHILS NFR BLD AUTO: 80.3 % — HIGH (ref 42.2–75.2)
NRBC # BLD: 0 /100 WBCS — SIGNIFICANT CHANGE UP (ref 0–0)
PLATELET # BLD AUTO: 168 K/UL — SIGNIFICANT CHANGE UP (ref 130–400)
PLATELET # BLD AUTO: 209 K/UL — SIGNIFICANT CHANGE UP (ref 130–400)
PLATELET # BLD AUTO: 267 K/UL — SIGNIFICANT CHANGE UP (ref 130–400)
PLATELET # BLD AUTO: 292 K/UL — SIGNIFICANT CHANGE UP (ref 130–400)
POTASSIUM SERPL-MCNC: 3.9 MMOL/L — SIGNIFICANT CHANGE UP (ref 3.5–5)
POTASSIUM SERPL-MCNC: 4.3 MMOL/L — SIGNIFICANT CHANGE UP (ref 3.5–5)
POTASSIUM SERPL-SCNC: 3.9 MMOL/L — SIGNIFICANT CHANGE UP (ref 3.5–5)
POTASSIUM SERPL-SCNC: 4.3 MMOL/L — SIGNIFICANT CHANGE UP (ref 3.5–5)
PROT SERPL-MCNC: 4.5 G/DL — LOW (ref 6–8)
PROT SERPL-MCNC: 5.9 G/DL — LOW (ref 6–8)
PROTHROM AB SERPL-ACNC: 13.7 SEC — HIGH (ref 9.95–12.87)
PROTHROM AB SERPL-ACNC: 14.4 SEC — HIGH (ref 9.95–12.87)
RBC # BLD: 3.33 M/UL — LOW (ref 4.7–6.1)
RBC # BLD: 3.72 M/UL — LOW (ref 4.7–6.1)
RBC # BLD: 4.14 M/UL — LOW (ref 4.7–6.1)
RBC # BLD: 4.34 M/UL — LOW (ref 4.7–6.1)
RBC # FLD: 12.7 % — SIGNIFICANT CHANGE UP (ref 11.5–14.5)
RBC # FLD: 13 % — SIGNIFICANT CHANGE UP (ref 11.5–14.5)
RBC # FLD: 14.6 % — HIGH (ref 11.5–14.5)
RBC # FLD: 15.3 % — HIGH (ref 11.5–14.5)
SARS-COV-2 RNA SPEC QL NAA+PROBE: SIGNIFICANT CHANGE UP
SODIUM SERPL-SCNC: 139 MMOL/L — SIGNIFICANT CHANGE UP (ref 135–146)
SODIUM SERPL-SCNC: 139 MMOL/L — SIGNIFICANT CHANGE UP (ref 135–146)
TROPONIN T SERPL-MCNC: <0.01 NG/ML — SIGNIFICANT CHANGE UP
WBC # BLD: 10.35 K/UL — SIGNIFICANT CHANGE UP (ref 4.8–10.8)
WBC # BLD: 11.24 K/UL — HIGH (ref 4.8–10.8)
WBC # BLD: 12.61 K/UL — HIGH (ref 4.8–10.8)
WBC # BLD: 14.43 K/UL — HIGH (ref 4.8–10.8)
WBC # FLD AUTO: 10.35 K/UL — SIGNIFICANT CHANGE UP (ref 4.8–10.8)
WBC # FLD AUTO: 11.24 K/UL — HIGH (ref 4.8–10.8)
WBC # FLD AUTO: 12.61 K/UL — HIGH (ref 4.8–10.8)
WBC # FLD AUTO: 14.43 K/UL — HIGH (ref 4.8–10.8)

## 2021-11-05 PROCEDURE — 74174 CTA ABD&PLVS W/CONTRAST: CPT | Mod: 26,MA

## 2021-11-05 PROCEDURE — 36247 INS CATH ABD/L-EXT ART 3RD: CPT

## 2021-11-05 PROCEDURE — 75726 ARTERY X-RAYS ABDOMEN: CPT | Mod: 26

## 2021-11-05 PROCEDURE — 99222 1ST HOSP IP/OBS MODERATE 55: CPT

## 2021-11-05 PROCEDURE — 75733 ARTERY X-RAYS ADRENALS: CPT | Mod: 26

## 2021-11-05 PROCEDURE — 99223 1ST HOSP IP/OBS HIGH 75: CPT

## 2021-11-05 PROCEDURE — 75774 ARTERY X-RAY EACH VESSEL: CPT | Mod: 26

## 2021-11-05 PROCEDURE — 76937 US GUIDE VASCULAR ACCESS: CPT | Mod: 26

## 2021-11-05 PROCEDURE — 93010 ELECTROCARDIOGRAM REPORT: CPT

## 2021-11-05 RX ORDER — CALCIUM GLUCONATE 100 MG/ML
1 VIAL (ML) INTRAVENOUS ONCE
Refills: 0 | Status: COMPLETED | OUTPATIENT
Start: 2021-11-05 | End: 2021-11-05

## 2021-11-05 RX ORDER — SERTRALINE 25 MG/1
100 TABLET, FILM COATED ORAL DAILY
Refills: 0 | Status: DISCONTINUED | OUTPATIENT
Start: 2021-11-05 | End: 2021-11-11

## 2021-11-05 RX ORDER — SODIUM CHLORIDE 9 MG/ML
1000 INJECTION, SOLUTION INTRAVENOUS ONCE
Refills: 0 | Status: COMPLETED | OUTPATIENT
Start: 2021-11-05 | End: 2021-11-05

## 2021-11-05 RX ORDER — ONDANSETRON 8 MG/1
4 TABLET, FILM COATED ORAL EVERY 6 HOURS
Refills: 0 | Status: DISCONTINUED | OUTPATIENT
Start: 2021-11-05 | End: 2021-11-08

## 2021-11-05 RX ORDER — CHLORHEXIDINE GLUCONATE 213 G/1000ML
1 SOLUTION TOPICAL
Refills: 0 | Status: DISCONTINUED | OUTPATIENT
Start: 2021-11-05 | End: 2021-11-11

## 2021-11-05 RX ORDER — AMLODIPINE BESYLATE 2.5 MG/1
1 TABLET ORAL
Qty: 0 | Refills: 0 | DISCHARGE

## 2021-11-05 RX ORDER — SIMVASTATIN 20 MG/1
20 TABLET, FILM COATED ORAL AT BEDTIME
Refills: 0 | Status: DISCONTINUED | OUTPATIENT
Start: 2021-11-05 | End: 2021-11-11

## 2021-11-05 RX ORDER — ACETAMINOPHEN 500 MG
650 TABLET ORAL EVERY 6 HOURS
Refills: 0 | Status: DISCONTINUED | OUTPATIENT
Start: 2021-11-05 | End: 2021-11-11

## 2021-11-05 RX ORDER — PIPERACILLIN AND TAZOBACTAM 4; .5 G/20ML; G/20ML
3.38 INJECTION, POWDER, LYOPHILIZED, FOR SOLUTION INTRAVENOUS ONCE
Refills: 0 | Status: DISCONTINUED | OUTPATIENT
Start: 2021-11-05 | End: 2021-11-05

## 2021-11-05 RX ORDER — MULTIVIT WITH MIN/MFOLATE/K2 340-15/3 G
1 POWDER (GRAM) ORAL ONCE
Refills: 0 | Status: COMPLETED | OUTPATIENT
Start: 2021-11-05 | End: 2021-11-05

## 2021-11-05 RX ORDER — OXYCODONE AND ACETAMINOPHEN 5; 325 MG/1; MG/1
2 TABLET ORAL EVERY 6 HOURS
Refills: 0 | Status: DISCONTINUED | OUTPATIENT
Start: 2021-11-05 | End: 2021-11-11

## 2021-11-05 RX ORDER — MAGNESIUM SULFATE 500 MG/ML
2 VIAL (ML) INJECTION ONCE
Refills: 0 | Status: COMPLETED | OUTPATIENT
Start: 2021-11-05 | End: 2021-11-05

## 2021-11-05 RX ORDER — SODIUM CHLORIDE 9 MG/ML
1000 INJECTION, SOLUTION INTRAVENOUS
Refills: 0 | Status: DISCONTINUED | OUTPATIENT
Start: 2021-11-05 | End: 2021-11-07

## 2021-11-05 RX ORDER — PIPERACILLIN AND TAZOBACTAM 4; .5 G/20ML; G/20ML
3.38 INJECTION, POWDER, LYOPHILIZED, FOR SOLUTION INTRAVENOUS EVERY 8 HOURS
Refills: 0 | Status: DISCONTINUED | OUTPATIENT
Start: 2021-11-05 | End: 2021-11-05

## 2021-11-05 RX ORDER — INFLUENZA VIRUS VACCINE 15; 15; 15; 15 UG/.5ML; UG/.5ML; UG/.5ML; UG/.5ML
0.5 SUSPENSION INTRAMUSCULAR ONCE
Refills: 0 | Status: DISCONTINUED | OUTPATIENT
Start: 2021-11-05 | End: 2021-11-11

## 2021-11-05 RX ORDER — SODIUM CHLORIDE 9 MG/ML
1000 INJECTION INTRAMUSCULAR; INTRAVENOUS; SUBCUTANEOUS
Refills: 0 | Status: DISCONTINUED | OUTPATIENT
Start: 2021-11-05 | End: 2021-11-05

## 2021-11-05 RX ORDER — MORPHINE SULFATE 50 MG/1
2 CAPSULE, EXTENDED RELEASE ORAL EVERY 6 HOURS
Refills: 0 | Status: DISCONTINUED | OUTPATIENT
Start: 2021-11-05 | End: 2021-11-06

## 2021-11-05 RX ORDER — NOREPINEPHRINE BITARTRATE/D5W 8 MG/250ML
0.05 PLASTIC BAG, INJECTION (ML) INTRAVENOUS
Qty: 8 | Refills: 0 | Status: DISCONTINUED | OUTPATIENT
Start: 2021-11-05 | End: 2021-11-06

## 2021-11-05 RX ADMIN — MORPHINE SULFATE 2 MILLIGRAM(S): 50 CAPSULE, EXTENDED RELEASE ORAL at 05:39

## 2021-11-05 RX ADMIN — SODIUM CHLORIDE 100 MILLILITER(S): 9 INJECTION, SOLUTION INTRAVENOUS at 13:57

## 2021-11-05 RX ADMIN — ONDANSETRON 4 MILLIGRAM(S): 8 TABLET, FILM COATED ORAL at 04:47

## 2021-11-05 RX ADMIN — ONDANSETRON 4 MILLIGRAM(S): 8 TABLET, FILM COATED ORAL at 13:48

## 2021-11-05 RX ADMIN — SIMVASTATIN 20 MILLIGRAM(S): 20 TABLET, FILM COATED ORAL at 22:19

## 2021-11-05 RX ADMIN — Medication 25 GRAM(S): at 13:28

## 2021-11-05 RX ADMIN — OXYCODONE AND ACETAMINOPHEN 2 TABLET(S): 5; 325 TABLET ORAL at 23:00

## 2021-11-05 RX ADMIN — SODIUM CHLORIDE 2000 MILLILITER(S): 9 INJECTION, SOLUTION INTRAVENOUS at 05:41

## 2021-11-05 RX ADMIN — SERTRALINE 100 MILLIGRAM(S): 25 TABLET, FILM COATED ORAL at 13:49

## 2021-11-05 RX ADMIN — SODIUM CHLORIDE 100 MILLILITER(S): 9 INJECTION, SOLUTION INTRAVENOUS at 22:00

## 2021-11-05 RX ADMIN — OXYCODONE AND ACETAMINOPHEN 2 TABLET(S): 5; 325 TABLET ORAL at 09:05

## 2021-11-05 RX ADMIN — OXYCODONE AND ACETAMINOPHEN 2 TABLET(S): 5; 325 TABLET ORAL at 22:19

## 2021-11-05 RX ADMIN — SODIUM CHLORIDE 100 MILLILITER(S): 9 INJECTION INTRAMUSCULAR; INTRAVENOUS; SUBCUTANEOUS at 04:26

## 2021-11-05 RX ADMIN — OXYCODONE AND ACETAMINOPHEN 2 TABLET(S): 5; 325 TABLET ORAL at 09:45

## 2021-11-05 RX ADMIN — Medication 1 BOTTLE: at 13:48

## 2021-11-05 RX ADMIN — SODIUM CHLORIDE 1000 MILLILITER(S): 9 INJECTION, SOLUTION INTRAVENOUS at 04:26

## 2021-11-05 RX ADMIN — Medication 100 GRAM(S): at 04:49

## 2021-11-05 NOTE — CONSULT NOTE ADULT - SUBJECTIVE AND OBJECTIVE BOX
INTERVENTIONAL RADIOLOGY CONSULT:     Procedure Requested: mesenteric angiogram and embolization    HPI:  62 yo M PMHx recurrent diverticulitits s/p partial colon resection in 2019, presents with LLQ pain and brbpr. Pt found to have active extravastaion in descending colon/signoid on CTA.   Pt became hyptensive in ED, not responding to IVF. Started MTP.       PAST MEDICAL & SURGICAL HISTORY:  Diverticulitis    HTN (hypertension)    HLD (hyperlipidemia)    H/O left knee surgery        MEDICATIONS  (STANDING):  calcium gluconate IVPB 1 Gram(s) IV Intermittent once  chlorhexidine 4% Liquid 1 Application(s) Topical <User Schedule>  ondansetron Injectable 4 milliGRAM(s) IV Push every 6 hours  sodium chloride 0.9%. 1000 milliLiter(s) (100 mL/Hr) IV Continuous <Continuous>    MEDICATIONS  (PRN):  acetaminophen     Tablet .. 650 milliGRAM(s) Oral every 6 hours PRN Temp greater or equal to 38C (100.4F), Mild Pain (1 - 3)      Allergies    No Known Allergies    Intolerances    FAMILY HISTORY:      Physical Exam:   Vital Signs Last 24 Hrs  T(C): 36.8 (05 Nov 2021 04:10), Max: 36.9 (04 Nov 2021 23:43)  T(F): 98.2 (05 Nov 2021 04:10), Max: 98.5 (04 Nov 2021 23:43)  HR: 59 (05 Nov 2021 04:44) (59 - 81)  BP: 89/53 (05 Nov 2021 04:26) (71/59 - 108/63)  BP(mean): 66 (05 Nov 2021 04:26) (56 - 79)  RR: 18 (05 Nov 2021 04:44) (18 - 20)  SpO2: 99% (05 Nov 2021 04:44) (99% - 100%)    Labs:                         12.4   14.43 )-----------( 267      ( 05 Nov 2021 04:01 )             37.8     11-05    139  |  105  |  16  ----------------------------<  113<H>  3.9   |  20  |  1.2    Ca    9.1      05 Nov 2021 01:00    TPro  5.9<L>  /  Alb  3.7  /  TBili  0.5  /  DBili  x   /  AST  21  /  ALT  33  /  AlkPhos  77  11-05    PT/INR - ( 05 Nov 2021 01:00 )   PT: 13.70 sec;   INR: 1.19 ratio         PTT - ( 05 Nov 2021 01:00 )  PTT:28.9 sec    Pertinent labs:                      12.4   14.43 )-----------( 267      ( 05 Nov 2021 04:01 )             37.8       11-05    139  |  105  |  16  ----------------------------<  113<H>  3.9   |  20  |  1.2    Ca    9.1      05 Nov 2021 01:00    TPro  5.9<L>  /  Alb  3.7  /  TBili  0.5  /  DBili  x   /  AST  21  /  ALT  33  /  AlkPhos  77  11-05      PT/INR - ( 05 Nov 2021 01:00 )   PT: 13.70 sec;   INR: 1.19 ratio         PTT - ( 05 Nov 2021 01:00 )  PTT:28.9 sec    Radiology & Additional Studies:     Radiology imaging reviewed.       ASSESSMENT/ PLAN:   62 yo M PMHx HTN, HLD, recurrent diverticulitis s/p partial colon resection in 2019, presents with LLQ pain and brbpr. Pt found to have active extravasation in descending colon/sigmoid on CTA.   HD unstable in ED, started MTP.  Hgb 14>13, not on anticoagulation  IR consulted for angiogram/embolization of LGIB.  - stabilize pt with IVF/pRBC/pressors as needed  - trend hgb  - GI consulted: pt not prepped for colonoscopy  - will plan for IR embolization  - pt to be admitted to ICU level of care after procedure      Risks, benefits, and alternatives to treatment discussed. All questions answered with understanding.    Thank you for the courtesy of this consult, please call [phone] with any further questions.

## 2021-11-05 NOTE — ED ADULT NURSE REASSESSMENT NOTE - NS ED NURSE REASSESS COMMENT FT1
As per MD Umana, pt ok to be transferred with blood infusing with active transfusion transfer paperwork incomplete.

## 2021-11-05 NOTE — H&P ADULT - ASSESSMENT
IMPRESSION:      PLAN:    CNS: Avoid Sedatives, EEG, Neurology consult, repeat CT head, sedation by XXXX, neurochecks     HEENT: Oral care, HOB at 45, speech and swallow evaluation    PULMONARY: Steroids, inhalers, Duoneb, Repeat CXR, Repeat ABG, Wean off NIV, legionella and streprococcal urine antigen    CARDIOVASCULAR:Continue pressors, ECHO, Cardiology consult, repeat EKG, repeat troponin, BNP     GI: GI prophylaxis,  Feeding, nutrition consult, GI consult    RENAL: I=0, monitor electrolytes and replete as needed, renal ultrasound,UA,  Nephrology consult, IV hydration by XXXX     INFECTIOUS DISEASE: Antibiotics, Blood cultures, Urine cultures, Sputum culutre and gram stain, ID consult, Check MRSA nares, RVP, FLU, Procalcitonin, HIV screening     HEMATOLOGICAL:  DVT prophylaxis, repeat coagulation profile, Keep Hb above 7, Active type and screen, Anticoagulation, VA duplex, check peripheral smear    ENDOCRINE:  Follow up FS.  Insulin protocol if needed. Endocrine consult    MUSCULOSKELETAL: Pt/rehab    TOXICOLOGY: Urine toxicology, Alcohol level, Acetaminophen level      Coley=  TLC=   Arterial line=   Peripheral=   OG/NG/PEG=  Code status=     IMPRESSION:  Intestinal hemorrhage with shock   Diverticulitis  HTN      PLAN:    CNS: Avoid Sedatives    HEENT: Oral care, HOB at 45, speech and swallow evaluation    PULMONARY: Aspiration precautions    CARDIOVASCULAR: Off pressors, volume resuscitation     GI: GI prophylaxis, NPO, IR for embolization, GI consult.    RENAL: I=0, monitor electrolytes and replete as needed,  IV hydration by XXXX     INFECTIOUS DISEASE: Monitor off abx     HEMATOLOGICAL:  SCD, Keep Hb above 7, Active type and screen    ENDOCRINE:  Follow up FS.  Insulin protocol if needed.    MUSCULOSKELETAL: Pt/rehab       IMPRESSION:    Intestinal hemorrhage with shock   HO Diverticulitis  HO HTN      PLAN:    CNS: Avoid Sedatives    HEENT: Oral care, HOB at 45    PULMONARY: Aspiration precautions    CARDIOVASCULAR: Received 2L LR bolus. On pressors. Keep MAP 60-65    GI: GI prophylaxis, received prbc x4, NPO, IR for embolization, GI consult.    RENAL: I=0, monitor electrolytes and replete as needed,  IV hydration NS @100cc/hrs     INFECTIOUS DISEASE: Zosyn. procal, bl cx      HEMATOLOGICAL:  SCD, Keep Hb above 7, Active type and screen. CBC around the clock     ENDOCRINE:  Follow up FS.  Insulin protocol if needed.    MUSCULOSKELETAL: Pt/rehab    FULL CODE        IMPRESSION:  Hypovolemic shock  Intestinal hemorrhage  HO Diverticulitis  HO HTN      PLAN:    CNS: Avoid Sedatives    HEENT: Oral care, HOB at 45    PULMONARY: Aspiration precautions    CARDIOVASCULAR: Received 2L LR bolus. Off pressors. Keep MAP 60-65, echo    GI: GI prophylaxis, pain control. Received prbc x4 and platelet x1, Keep NPO, IR for embolization, GI consult.    RENAL: I=0, monitor electrolytes and replete as needed,  IV hydration NS @100cc/hrs     INFECTIOUS DISEASE: Zosyn. procal, bl cx      HEMATOLOGICAL:  SCD, Keep Hb above 7, Active type and screen. CBC Q6H    ENDOCRINE:  Follow up FS.  Insulin protocol if needed.    MUSCULOSKELETAL: Pt/rehab    FULL CODE   MICU       IMPRESSION:  Hypovolemic shock / Hemorrhagic shock   LGIB   HO Diverticulitis SP partial large bowel resection    HO HTN      PLAN:    CNS: Avoid Sedatives    HEENT: Oral care, HOB at 45    PULMONARY: Aspiration precautions    CARDIOVASCULAR: Keep MAP 60, Echo.  CE     GI: GI prophylaxis, pain control. Received prbc x4 and platelet x1, Keep NPO, IR for embolization, GI consult.     RENAL: I=0, monitor electrolytes and replete as needed,  IV hydration LR @100cc/hrs     INFECTIOUS DISEASE: Monitor OFF ABX. procal, bl cx      HEMATOLOGICAL:  SCD, Keep Hb above 8, Active type and screen. CBC Q6H and Coags.  DIMEr     ENDOCRINE:  Follow up FS.  Insulin protocol if needed.    MUSCULOSKELETAL: Bed rest     FULL CODE   MICU

## 2021-11-05 NOTE — H&P ADULT - NSHPREVIEWOFSYSTEMS_GEN_ALL_CORE
REVIEW OF SYSTEMS:    CONSTITUTIONAL: No weakness, fevers or chills  EYES/ENT: No visual changes;  No vertigo or throat pain   NECK: No pain or stiffness  RESPIRATORY: No cough, wheezing, hemoptysis; No shortness of breath  CARDIOVASCULAR: No chest pain or palpitations  GASTROINTESTINAL: No abdominal or epigastric pain. No nausea, vomiting, or hematemesis; No diarrhea or constipation. No melena or hematochezia.  GENITOURINARY: No dysuria, frequency or hematuria  NEUROLOGICAL: No numbness or weakness  SKIN: No itching, rashes REVIEW OF SYSTEMS:    CONSTITUTIONAL: weakness. No fevers or chills  EYES/ENT: No visual changes;  No vertigo or throat pain   NECK: No pain or stiffness  RESPIRATORY: No cough, wheezing, hemoptysis; No shortness of breath  CARDIOVASCULAR: No chest pain or palpitations  GASTROINTESTINAL: LLQ abdominal pain and nausea. (+) BRBPR. No vomiting, or hematemesis;  GENITOURINARY: No dysuria, frequency or hematuria  NEUROLOGICAL: No numbness or weakness  SKIN: No itching, rashes REVIEW OF SYSTEMS:    CONSTITUTIONAL: weakness, dizzeness. No fevers or chills  EYES/ENT: No visual changes;  No vertigo or throat pain   NECK: No pain or stiffness  RESPIRATORY: No cough, wheezing, hemoptysis; No shortness of breath  CARDIOVASCULAR: No chest pain or palpitations  GASTROINTESTINAL: LLQ 7/10 abdominal pain and nausea. (+) BRBPR. No vomiting, or hematemesis;  GENITOURINARY: No dysuria, frequency or hematuria  NEUROLOGICAL: H/a. No numbness or weakness  SKIN: No itching, rashes REVIEW OF SYSTEMS:    CONSTITUTIONAL: weakness, dizziness. No fevers or chills  EYES/ENT: No visual changes;  No vertigo or throat pain   NECK: No pain or stiffness  RESPIRATORY: No cough, wheezing, hemoptysis; No shortness of breath  CARDIOVASCULAR: No chest pain or palpitations  GASTROINTESTINAL: LLQ 7/10 abdominal pain and nausea. (+) BRBPR. No vomiting, or hematemesis;  GENITOURINARY: No dysuria, frequency or hematuria  NEUROLOGICAL: H/a. No numbness or weakness  SKIN: No itching, rashes

## 2021-11-05 NOTE — CONSULT NOTE ADULT - SUBJECTIVE AND OBJECTIVE BOX
Gastroenterology Consultation:    Patient is a 61y old  Male who presents with a chief complaint of Rectal bleed (05 Nov 2021 07:51)      Admitted on: 11-05-21  HPI:  60 yo M PMHx of diverticulitis s/p partial colon resection 2019 at [place], HTN, DLD transferred from [facility] due to BRBPR x1d.   Per wife at bedside, states pt began to feel LLQ pain one week ago, contacted Dr Nava who prescribed a course of two antibiotics (does not recall names), pt completed abx course with no improvement in pain. Yesterday experienced BRBPR x4, went to [facility], CT A/P without abnormalities and stable Hb so was d/c home. BRBPR continued, [place] given prbc x2 and transferred to [place]. Hypotensive on admission, 60/40, code fusion called for initiation of MTP. Pt is s/p 2 additional units prbcs, platelet x1 and calcium gluconate x1, 2L LR bolus. BP improved to MAP 67 and pt did not need pressor support. CTA A/P showing active intraluminal contrast extravasation in the distal descending colon/proximal sigmoid, compatible with active intestinal hemorrhage. IR c/s for embolization.   ED VS, T(F) Max: 98.5 HR: 63 BP: 89/53 MAP 66 RR: 18 SpO2: 99% in RA. Labs notable for Hb 12.4 <- 14.6. CXR unremarkable.     Admitted for hypovolemic shock due to ongoing intestinal hemorrhage.      (05 Nov 2021 03:49)      Prior Colonoscopy: 3-4 years ago by Dr. Nava       PAST MEDICAL & SURGICAL HISTORY:  Diverticulitis    HTN (hypertension)    HLD (hyperlipidemia)    H/O left knee surgery    S/P partial colectomy  2019        FAMILY HISTORY:  No family h/o Gi cancers    Social History:  Tobacco: n  Alcohol: n  Drugs: n     Home Medications:  amLODIPine 10 mg oral tablet: 1 tab(s) orally once a day (05 Nov 2021 05:04)  Bystolic 5 mg oral tablet: 1 tab(s) orally once a day (16 Sep 2019 17:23)  furosemide 20 mg oral tablet: 1 tab(s) orally once a day (16 Sep 2019 17:23)  lisinopril 40 mg oral tablet: 1 tab(s) orally once a day (16 Sep 2019 17:23)  sertraline 100 mg oral tablet: 1 tab(s) orally once a day (16 Sep 2019 17:23)  simvastatin 20 mg oral tablet: 1 tab(s) orally once a day (at bedtime) (16 Sep 2019 17:23)    MEDICATIONS  (STANDING):  chlorhexidine 4% Liquid 1 Application(s) Topical <User Schedule>  influenza   Vaccine 0.5 milliLiter(s) IntraMuscular once  lactated ringers. 1000 milliLiter(s) (100 mL/Hr) IV Continuous <Continuous>  norepinephrine Infusion 0.05 MICROgram(s)/kG/Min (11.9 mL/Hr) IV Continuous <Continuous>  ondansetron Injectable 4 milliGRAM(s) IV Push every 6 hours  sertraline 100 milliGRAM(s) Oral daily  simvastatin 20 milliGRAM(s) Oral at bedtime    MEDICATIONS  (PRN):  acetaminophen     Tablet .. 650 milliGRAM(s) Oral every 6 hours PRN Temp greater or equal to 38C (100.4F), Mild Pain (1 - 3)  morphine  - Injectable 2 milliGRAM(s) IV Push every 6 hours PRN Severe Pain (7 - 10)  oxycodone    5 mG/acetaminophen 325 mG 2 Tablet(s) Oral every 6 hours PRN Moderate Pain (4 - 6)      Allergies  No Known Allergies      Review of Systems:   Constitutional:  No Fever, No Chills  ENT/Mouth:  No Hearing Changes,  No Difficulty Swallowing  Eyes:  No Eye Pain, No Vision Changes  Cardiovascular:  No Chest Pain, No Palpitations  Respiratory:  No Cough, No Dyspnea  Gastrointestinal:  As described in HPI  Musculoskeletal:  No Joint Swelling, No Back Pain  Skin:  No Skin Lesions, No Jaundice  Neuro:  No Syncope, No Dizziness  Heme/Lymph:  No Bruising, No Bleeding.          Physical Examination:  T(C): 37.2 (11-05-21 @ 20:00), Max: 37.2 (11-05-21 @ 20:00)  HR: 66 (11-05-21 @ 20:00) (59 - 85)  BP: 109/67 (11-05-21 @ 20:00) (71/59 - 121/58)  RR: 16 (11-05-21 @ 20:00) (12 - 20)  SpO2: 95% (11-05-21 @ 20:00) (95% - 100%)  Height (cm): 279.4 (11-05-21 @ 16:52)  Weight (kg): 127 (11-05-21 @ 16:52)    11-05-21 @ 07:01  -  11-05-21 @ 23:02  --------------------------------------------------------  IN: 1296 mL / OUT: 400 mL / NET: 896 mL        Constitutional: No acute distress.  Eyes:. Conjunctivae are clear, Sclera is non-icteric.  Ears Nose and Throat: The external ears are normal appearing,  Oral mucosa is pink and moist.  Respiratory:  No signs of respiratory distress. Lung sounds are clear bilaterally.  Cardiovascular:  S1 S2, Regular rate and rhythm.  GI: Abdomen is soft, symmetric, and non-tender without distention. There are no visible lesions or scars. Bowel sounds are present and normoactive in all four quadrants. No masses, hepatomegaly, or splenomegaly are noted.   Neuro: No Tremor, No involuntary movements  Skin: No rashes, No Jaundice.          Data:                        10.1   11.24 )-----------( 168      ( 05 Nov 2021 21:25 )             30.7     Hgb Trend:  10.1  11-05-21 @ 21:25  11.2  11-05-21 @ 11:40  12.4  11-05-21 @ 04:01  13.2  11-05-21 @ 01:00  14.6  11-03-21 @ 21:50        11-05    139  |  113<H>  |  17  ----------------------------<  120<H>  4.3   |  17  |  1.1    Ca    7.8<L>      05 Nov 2021 11:40  Mg     1.6     11-05    TPro  4.5<L>  /  Alb  3.0<L>  /  TBili  1.1  /  DBili  x   /  AST  15  /  ALT  21  /  AlkPhos  55  11-05    Liver panel trend:  TBili 1.1   /   AST 15   /   ALT 21   /   AlkP 55   /   Tptn 4.5   /   Alb 3.0    /   DBili --      11-05  TBili 0.5   /   AST 21   /   ALT 33   /   AlkP 77   /   Tptn 5.9   /   Alb 3.7    /   DBili --      11-05  TBili 0.5   /   AST 26   /   ALT 40   /   AlkP 86   /   Tptn 6.8   /   Alb 4.5    /   DBili <0.2      11-03      PT/INR - ( 05 Nov 2021 11:40 )   PT: 14.40 sec;   INR: 1.25 ratio         PTT - ( 05 Nov 2021 11:40 )  PTT:26.2 sec        Radiology:  CT Angio Abdomen and Pelvis w/ IV Cont:   EXAM:  CT ANGIO ABD PELV (W)AW IC            PROCEDURE DATE:  11/05/2021            INTERPRETATION:  CLINICAL HISTORY/REASON FOR EXAM: Rectal bleeding. History of diverticulitis status post partial colon resection.    TECHNIQUE: Contiguous axial CTimages were obtained from the chest to the pubic symphysis following administration of 95 cc Optiray 320 intravenous contrast.  0cc of contrast were discarded and without IV contrast.  Oral contrast was administered.  Reformatted images in the coronal and sagittal planes were acquired.    COMPARISON: CT abdomen pelvis with oral contrast 11/4/2021.    FINDINGS:    CT ANGIOGRAPHY:    The abdominal aorta is normal in caliber.  The celiac artery, SMA, bilateral renal arteries and DALJIT are patent. No evidence of aneurysm or dissection.    There is active intraluminal contrast extravasation in the distal descending colon/proximal sigmoid (series 5/125).    HEPATOBILIARY: Unchanged.    SPLEEN: Unchanged.    PANCREAS: Unchanged.    ADRENAL GLANDS: Unchanged.    KIDNEYS: Unchanged.    ABDOMINOPELVIC NODES: Unchanged.    PELVIC ORGANS: Unchanged.    PERITONEUM/MESENTERY/BOWEL: Oral contrast is seen within the ascending and transverse colon, limiting evaluation. No bowel obstruction, pneumoperitoneumor ascites. Colonic diverticulosis. Normal appendix. There is active intraluminal contrast extravasation in the distal descending colon/proximal sigmoid (series 5/125).    BONES/SOFT TISSUES: Unchanged.      IMPRESSION:  1. Active intraluminal contrast extravasation in the distal descending colon/proximal sigmoid, compatible with active intestinal hemorrhage.    2. Oral contrast has reached distal transverse colon.    3. Colonic diverticulosis.    Dr. Brian Addison discussed preliminary findings with SONYA RONDON MD on 11/5/2021 2:33 AM with readback.    --- End of Report ---            BRIAN ADDISON MD; Resident Radiologist  This document has been electronically signed.  MEGHAN QUEEN MD; Attending Radiologist  This document has been electronically signed. Nov 5 2021  2:37AM (11-05-21 @ 01:55)

## 2021-11-05 NOTE — PRE-ANESTHESIA EVALUATION ADULT - NSANTHPMHFT_GEN_ALL_CORE
Chart reviewed, pt interviewed and examined.   H/O Rectal bleed, pt had 4 units of PRBC. H/H 11.2/32.7

## 2021-11-05 NOTE — CONSULT NOTE ADULT - ASSESSMENT
60 yo M PMHx of diverticulitis s/p partial colon resection 2019 (left hemicolectomy) at [place], HTN, [place] BRBPR x1d.     #)Lower GI bleed secondary to likely diverticulosis  -Hemodynamically unstable at the time of admission s/p 4 units PRBC, 1 unit FFP  -CTA positive for arterial extravasation in the distal descending colon/proximal sigmoid  -s/p IR angiogram 11/5 DALJIT and selective sigmoid angiography without evidence of active contrast extravasation  -Stabilised after 4 units PRBC and IVF  -s/p flex sigmoidoscopy after mag citrate and enema prep 11/5 with no evidence of active bleeding, showed severe diverticulosis  -Hemodynamically stable now  -Baseline Hb 12-13, current Hb 10    Recs:   NPO   clear lqiuid diet if Hb stable tomorrow  Trend CBC BID, Keep Hb>8  2 18 gauge  active type and screen   c/w IVF  will plan for colonoscopy on monday

## 2021-11-05 NOTE — CONSULT NOTE ADULT - ATTENDING COMMENTS
Hx of diverticulosis, complicated diverticulitis SP resection and anastomosis presenting for alana hematochezia. please perform rapid prep and enemas for flex sig possible colonosocpy

## 2021-11-05 NOTE — ED PROVIDER NOTE - PROGRESS NOTE DETAILS
CP: Patient arrived from [facility]. Hypotensive to 60's/40's with LLQ pain. Activated code fusion. IR contacted upon arrival and evaluated patient in ED. Patient signed out to ICU resident. fs: Patient found to have active extravasation on ct, we initiated emergent transfusion, lights and sirens transfer initiated for potential IR intervention fs: I have discussed with IR plan for transfer and intervention given hypotension and bleeding fs: I have discussed case with MICU who agrees with plan of care aware of lights and sirens transfer fs: patient sustained syncopal episode in the Ed repeat ekg unchanged.  he is not tachycardic bp upper 90's at this time we have ordered cta ab/pelvis and have continued fluid bolus pt transferred from [facility] for IR intervention for GI bleed. Pt with hypotension, s/p receiving 2 units PRBCs, code fusion called and additional blood products ordered. pt with IV access, BP improved with products. IR evaluated at bedside and attending Dr. Flores aware and doing procedure emergently. GI paged by me and I spoke with fellow Dr. Arboleda for consultation. ICU resident aware of admission and at bedside.

## 2021-11-05 NOTE — H&P ADULT - ATTENDING COMMENTS
IMPRESSION:  Hypovolemic shock / Hemorrhagic shock   LGIB   HO Diverticulitis SP partial large bowel resection    HO HTN      Plan as outlined above

## 2021-11-05 NOTE — PRE-ANESTHESIA EVALUATION ADULT - NSANTHALCOHOLSD_GEN_ALL_CORE
Consent: The risks of atrophy were reviewed with the patient.
Concentration Of Solution Injected (Mg/Ml): 10.0
No
Detail Level: Detailed
Medical Necessity Clause: This procedure was medically necessary because the lesions that were treated were:
Include Z78.9 (Other Specified Conditions Influencing Health Status) As An Associated Diagnosis?: No
No
Size Of Lesion (Optional): 0.5
Kenalog Preparation: Kenalog
Administered By (Optional): FRANCO Mccabe MD
X Size Of Lesion In Cm (Optional): 0
Total Volume Injected (Ccs- Only Use Numbers And Decimals): .5

## 2021-11-05 NOTE — ED PROVIDER NOTE - ATTENDING CONTRIBUTION TO CARE
I was present for and supervised the key and critical aspects of the procedures performed during the care of the patient. Patient presents for evaluation of left lower quad pain and brbpr over the past several hours pain is described as cramping in nature, moderate and intermittent. Patient has history of partial colectomy in 2019 at [facility].  He was seen and evaluated 1 day prior for similar symptoms he had labs as well as ct which was negative the patient improved but symptoms returned earlier today   on physical exam the patient is nc/at perrla eomi oropharynx clear cta b/l, rrr s1s2 noted radial pulses 2 +=, pedal pulses 2 +=, abd-soft no guarding no rebound, rectal exam reveals mild spotting brbpr  a/p- I have reviewed prior visit.  based on recurrence of symptoms given iv fluids we obtained labs and will plan to admit for colonoscopy in am     patient worsened in ed (see progress notes), he had second iv placed 2 liter bolus given, patient syncopized at which point we obtained cta which reveals active extravasation he was emergently transfusion of 2nd liter of blood and transferred to [facility] for further evaluation we contacted ICU, Radiology for possible interventional given symptoms

## 2021-11-05 NOTE — ED PROVIDER NOTE - NSICDXPASTSURGICALHX_GEN_ALL_CORE_FT
PAST SURGICAL HISTORY:  H/O left knee surgery      PAST SURGICAL HISTORY:  H/O left knee surgery     S/P partial colectomy 2019

## 2021-11-05 NOTE — H&P ADULT - NSHPPHYSICALEXAM_GEN_ALL_CORE
PHYSICAL EXAM:  GENERAL: NAD, lying in bed comfortably  HEAD:  Atraumatic, Normocephalic  EYES: EOMI, PERRLA, conjunctiva and sclera clear  ENT: Moist mucous membranes  NECK: Supple, No JVD  CHEST/LUNG: Clear to auscultation bilaterally; No rales, rhonchi, wheezing, or rubs. Unlabored respirations  HEART: Regular rate and rhythm; No murmurs, rubs, or gallops  ABDOMEN: Bowel sounds present; Soft, Nontender, Nondistended. No hepatomegally  EXTREMITIES:  2+ Peripheral Pulses, brisk capillary refill. No clubbing, cyanosis, or edema  NERVOUS SYSTEM:  Alert & Oriented X3, speech clear. No deficits   MSK: FROM all 4 extremities, full and equal strength  SKIN: No rashes or lesions PHYSICAL EXAM:  GENERAL: NAD, appears uncomfortable, diaphoretic  HEAD:  Atraumatic, Normocephalic  EYES: EOMI, PERRLA, conjunctiva and sclera clear  ENT: Moist mucous membranes  NECK: Supple, No JVD, does not appear clinically dry  CHEST/LUNG: Limited due to pt not able to move. Clear to auscultation bilaterally  HEART: Regular rate and rhythm; No murmurs, rubs, or gallops  ABDOMEN: Bowel sounds present; Nontender, distended, tense. No hepatomegally  EXTREMITIES:  2+ Peripheral Pulses, brisk capillary refill. No clubbing, cyanosis, or edema  NERVOUS SYSTEM:  Alert & Oriented X3, speech clear. No deficits   MSK: FROM all 4 extremities, full and equal strength  SKIN: No rashes or lesions PHYSICAL EXAM:  GENERAL: NAD, appears uncomfortable, diaphoretic with ongoing LGIB   HEAD:  Atraumatic, Normocephalic  EYES: EOMI, PERRLA, conjunctiva and sclera clear  ENT: Moist mucous membranes  NECK: Supple, No JVD, does not appear clinically dry  CHEST/LUNG: Limited due to pt not able to move. Clear to auscultation bilaterally  HEART: Regular rate and rhythm; No murmurs, rubs, or gallops  ABDOMEN: Bowel sounds present; Nontender, distended, tense. No hepatomegaly  EXTREMITIES:  2+ Peripheral Pulses, brisk capillary refill. No clubbing, cyanosis, or edema  NERVOUS SYSTEM:  Alert & Oriented X3, speech clear. No deficits   MSK: FROM all 4 extremities, full and equal strength  SKIN: No rashes or lesions

## 2021-11-05 NOTE — PROGRESS NOTE ADULT - SUBJECTIVE AND OBJECTIVE BOX
INTERVENTIONAL RADIOLOGY BRIEF-OPERATIVE NOTE    Procedure: Mesenteric angiogram    Pre-Op Diagnosis: Lower GI bleed    Post-Op Diagnosis: Same    Attending: Rc Flores DO  Resident: David Fuentes MD    Anesthesia (type):  [x] General Anesthesia  [ ] Sedation  [ ] Spinal Anesthesia  [x] Local/Regional    Contrast: Visipaque via intravascular catheter    Estimated Blood Loss: 10cc    Condition:   [ ] Critical  [ ] Serious  [ ] Fair   [x] Good    Findings/Follow up Plan of Care:  DALJIT and selective sigmoid angiography without evidence of active contrast extravasation. The sigmoid arcade vessels were very small caliber and likely in spasm. Findings were discussed with Dr. Bacon.    Specimens Removed: None.    Implants: None.    Complications: None immediate.    Disposition: Anticipate return to previous level of care following short-interval monitoring in recovery.    Please call Interventional Radiology with questions or concerns:   - M-F [id number]: [phone]   - All other hours: [phone] INTERVENTIONAL RADIOLOGY BRIEF-OPERATIVE NOTE    Procedure: Mesenteric angiogram    Pre-Op Diagnosis: Lower GI bleed    Post-Op Diagnosis: Same    Attending: Rc Flores DO  Resident: David Fuentes MD    Anesthesia (type):  [x] MAC  [ ] Sedation  [ ] Spinal Anesthesia  [x] Local/Regional    Contrast: Visipaque via intravascular catheter    Estimated Blood Loss: 10cc    Condition:   [ ] Critical  [ ] Serious  [ ] Fair   [x] Good    Findings/Follow up Plan of Care:  DALJIT and selective sigmoid angiography without evidence of active contrast extravasation. The sigmoid arcade vessels were very small caliber and likely in spasm. Findings were discussed with Dr. Bacon.    Specimens Removed: None.    Implants: None.    Complications: None immediate.    Disposition: Anticipate return to previous level of care following short-interval monitoring in recovery.  Recommend GI evaluation for colonoscopy.      Please call Interventional Radiology with questions or concerns:   - M-F [id number]: [phone]   - All other hours: [phone]

## 2021-11-05 NOTE — CHART NOTE - NSCHARTNOTEFT_GEN_A_CORE
Colonoscopy findings: Colonoscopy findings:  Severe diverticulosis throughout the colon. Blood clots and stools limited the visualization but no evidence of active bleeding was noted.     Plan:    rectal bleeding likely due to severe diverticulosis  NPO   clear liquid diet if Hb is stable tomorrow  repeat colonoscopy likely on monday

## 2021-11-05 NOTE — H&P ADULT - HISTORY OF PRESENT ILLNESS
60 yo M PMHx 60 yo M PMHx of diverticulitis s/p partial colon resection 2019 at [place], HTN, DLD transferred from [facility] due to LGIB x1d. Per wife at bedside, pt began to feel LLQ pain one week ago, contacted Dr Nava who prescribed a course of two antibiotics (does not recall names), pt completed abx course with no improvement in pain. Yesterday experienced BRBPR x4, went to [facility], CT A/P without abnormalities and stable Hb so was d/c home. BRBPR continued, [place], given prbc x2 and transferred to [place]. Hypotensive on admission, 60/40, code fusion called. Pt is s/p 2 additional units prbcs plus platelet x1. BP improved to MAP 67 and pt did not need pressor support. CTA A/P showing active intraluminal contrast extravasation in the distal descending colon/proximal sigmoid, compatible with active intestinal hemorrhage. IR c/s for embolization.   ED VS, T(F) Max: 98.5 HR: 63 BP: 89/53 MAP 66 RR: 18 SpO2: 99% in RA. Labs notable for Hb 12.4 <- 14.6. CXR unremarkable.      60 yo M PMHx of diverticulitis s/p partial colon resection 2019 at [place], HTN, DLD transferred from [facility] due to LGIB x1d. Per wife at bedside, pt began to feel LLQ pain one week ago, contacted Dr Nava who prescribed a course of two antibiotics (does not recall names), pt completed abx course with no improvement in pain. Yesterday experienced BRBPR x4, went to [facility], CT A/P without abnormalities and stable Hb so was d/c home. BRBPR continued, pt was brought back [place], given prbc x2 and transferred to [place]. Hypotensive on admission, 60/40, code fusion called. Pt is s/p 2 additional units prbcs plus platelet x1. BP improved to MAP 67 and pt did not need pressor support. CTA A/P showing active intraluminal contrast extravasation in the distal descending colon/proximal sigmoid, compatible with active intestinal hemorrhage. IR c/s for embolization.   ED VS, T(F) Max: 98.5 HR: 63 BP: 89/53 MAP 66 RR: 18 SpO2: 99% in RA. Labs notable for Hb 12.4 <- 14.6. CXR unremarkable.     Admitted for hypovolemic shock due to ongoing intestinal hemorrhage.      62 yo M PMHx of diverticulitis s/p partial colon resection 2019 at [place], HTN, DLD transferred from [facility] due to BRBPR x1d.   Per wife at bedside, states pt began to feel LLQ pain one week ago, contacted Dr Nava who prescribed a course of two antibiotics (does not recall names), pt completed abx course with no improvement in pain. Yesterday experienced BRBPR x4, went to [facility], CT A/P without abnormalities and stable Hb so was d/c home. BRBPR continued, [place], given prbc x2 and transferred to [place]. Hypotensive on admission, 60/40, code fusion called for initiation of MTP. Pt is s/p 2 additional units prbcs, platelet x1 and calcium gluconate x1, 2L LR bolus. BP improved to MAP 67 and pt did not need pressor support. CTA A/P showing active intraluminal contrast extravasation in the distal descending colon/proximal sigmoid, compatible with active intestinal hemorrhage. IR c/s for embolization.   ED VS, T(F) Max: 98.5 HR: 63 BP: 89/53 MAP 66 RR: 18 SpO2: 99% in RA. Labs notable for Hb 12.4 <- 14.6. CXR unremarkable.     Admitted for hypovolemic shock due to ongoing intestinal hemorrhage.

## 2021-11-05 NOTE — PROCEDURAL SAFETY CHECKLIST WITH OR WITHOUT SEDATION - NSPOSTDEBRIEFDT_GEN_ALL_CORE
05-Nov-2021 04:40 No cyanosis, no pallor, no jaundice, no rash +evulsion to pad of index finger on right hand

## 2021-11-05 NOTE — ED PROVIDER NOTE - PHYSICAL EXAMINATION
CONSTITUTIONAL: Well-appearing; well-nourished; in no apparent distress.   EYES: PERRL; EOM intact. pink conjunctiva  CARDIOVASCULAR: Normal S1, S2; no murmurs, rubs, or gallops.   RESPIRATORY: Normal chest excursion with respiration; breath sounds clear and equal bilaterally; no wheezes, rhonchi, or rales.  GI/: + LLQ tenderness. Normal bowel sounds; non-distended; no palpable organomegaly. rectal: + PRBPR. no clot. no active bleeding.   MS: No calf swelling and tenderness.  SKIN: Normal for age and race; warm; dry; good turgor; no apparent lesions or exudate.   NEURO/PSYCH: A & O x 4; grossly unremarkable.

## 2021-11-05 NOTE — ED PROVIDER NOTE - CLINICAL SUMMARY MEDICAL DECISION MAKING FREE TEXT BOX
fs: patient transferred to [facility] ed to ed s/p 2 liter bolus, 2 units of prbc in place, ICU aware prior to transfer, radiology aware upon transfer. family and patient updated and agree with the plan of care

## 2021-11-05 NOTE — ED PROVIDER NOTE - OBJECTIVE STATEMENT
60 yo male hx of HTN/HLD/diverticulitis s/p partial colectomy with anastomosis in 2019 present c/o rectal bleeding x6-7 times since 11pm. patient was evaluated in ED a day ago with similar complaint (6 episodes a day ago) and CT with PO/IV and it was negative and patient preferred to go home 2/2 his daughter's wedding is coming on Sunday. Bleeding started this evening heavily associated with lightheadedness so he returned to ED. also reported LLQ pain.  denies fever/chill/chest pain/sob and urinary sxs.

## 2021-11-06 LAB
ALBUMIN SERPL ELPH-MCNC: 2.5 G/DL — LOW (ref 3.5–5.2)
ALP SERPL-CCNC: 43 U/L — SIGNIFICANT CHANGE UP (ref 30–115)
ALT FLD-CCNC: 15 U/L — SIGNIFICANT CHANGE UP (ref 0–41)
ANION GAP SERPL CALC-SCNC: 11 MMOL/L — SIGNIFICANT CHANGE UP (ref 7–14)
AST SERPL-CCNC: 10 U/L — SIGNIFICANT CHANGE UP (ref 0–41)
BASOPHILS # BLD AUTO: 0.05 K/UL — SIGNIFICANT CHANGE UP (ref 0–0.2)
BASOPHILS # BLD AUTO: 0.05 K/UL — SIGNIFICANT CHANGE UP (ref 0–0.2)
BASOPHILS NFR BLD AUTO: 0.4 % — SIGNIFICANT CHANGE UP (ref 0–1)
BASOPHILS NFR BLD AUTO: 0.6 % — SIGNIFICANT CHANGE UP (ref 0–1)
BILIRUB SERPL-MCNC: 0.7 MG/DL — SIGNIFICANT CHANGE UP (ref 0.2–1.2)
BUN SERPL-MCNC: 12 MG/DL — SIGNIFICANT CHANGE UP (ref 10–20)
CALCIUM SERPL-MCNC: 7.3 MG/DL — LOW (ref 8.5–10.1)
CHLORIDE SERPL-SCNC: 108 MMOL/L — SIGNIFICANT CHANGE UP (ref 98–110)
CO2 SERPL-SCNC: 17 MMOL/L — SIGNIFICANT CHANGE UP (ref 17–32)
COVID-19 NUCLEOCAPSID GAM AB INTERP: NEGATIVE — SIGNIFICANT CHANGE UP
COVID-19 NUCLEOCAPSID TOTAL GAM ANTIBODY RESULT: 0.2 INDEX — SIGNIFICANT CHANGE UP
COVID-19 SPIKE DOMAIN AB INTERP: POSITIVE
COVID-19 SPIKE DOMAIN ANTIBODY RESULT: >250 U/ML — HIGH
CREAT SERPL-MCNC: 0.7 MG/DL — SIGNIFICANT CHANGE UP (ref 0.7–1.5)
EOSINOPHIL # BLD AUTO: 0.24 K/UL — SIGNIFICANT CHANGE UP (ref 0–0.7)
EOSINOPHIL # BLD AUTO: 0.24 K/UL — SIGNIFICANT CHANGE UP (ref 0–0.7)
EOSINOPHIL NFR BLD AUTO: 2.1 % — SIGNIFICANT CHANGE UP (ref 0–8)
EOSINOPHIL NFR BLD AUTO: 2.8 % — SIGNIFICANT CHANGE UP (ref 0–8)
GLUCOSE SERPL-MCNC: 101 MG/DL — HIGH (ref 70–99)
HCT VFR BLD CALC: 25.4 % — LOW (ref 42–52)
HCT VFR BLD CALC: 27.3 % — LOW (ref 42–52)
HCT VFR BLD CALC: 28 % — LOW (ref 42–52)
HCT VFR BLD CALC: 29.5 % — LOW (ref 42–52)
HCV AB S/CO SERPL IA: 0.04 COI — SIGNIFICANT CHANGE UP
HCV AB SERPL-IMP: SIGNIFICANT CHANGE UP
HGB BLD-MCNC: 8.4 G/DL — LOW (ref 14–18)
HGB BLD-MCNC: 9.2 G/DL — LOW (ref 14–18)
HGB BLD-MCNC: 9.5 G/DL — LOW (ref 14–18)
HGB BLD-MCNC: 9.8 G/DL — LOW (ref 14–18)
IMM GRANULOCYTES NFR BLD AUTO: 0.3 % — SIGNIFICANT CHANGE UP (ref 0.1–0.3)
IMM GRANULOCYTES NFR BLD AUTO: 0.3 % — SIGNIFICANT CHANGE UP (ref 0.1–0.3)
LYMPHOCYTES # BLD AUTO: 2.98 K/UL — SIGNIFICANT CHANGE UP (ref 1.2–3.4)
LYMPHOCYTES # BLD AUTO: 3.42 K/UL — HIGH (ref 1.2–3.4)
LYMPHOCYTES # BLD AUTO: 30.1 % — SIGNIFICANT CHANGE UP (ref 20.5–51.1)
LYMPHOCYTES # BLD AUTO: 34.7 % — SIGNIFICANT CHANGE UP (ref 20.5–51.1)
MAGNESIUM SERPL-MCNC: 1.6 MG/DL — LOW (ref 1.8–2.4)
MCHC RBC-ENTMCNC: 29.9 PG — SIGNIFICANT CHANGE UP (ref 27–31)
MCHC RBC-ENTMCNC: 30.1 PG — SIGNIFICANT CHANGE UP (ref 27–31)
MCHC RBC-ENTMCNC: 30.3 PG — SIGNIFICANT CHANGE UP (ref 27–31)
MCHC RBC-ENTMCNC: 30.4 PG — SIGNIFICANT CHANGE UP (ref 27–31)
MCHC RBC-ENTMCNC: 33.1 G/DL — SIGNIFICANT CHANGE UP (ref 32–37)
MCHC RBC-ENTMCNC: 33.2 G/DL — SIGNIFICANT CHANGE UP (ref 32–37)
MCHC RBC-ENTMCNC: 33.7 G/DL — SIGNIFICANT CHANGE UP (ref 32–37)
MCHC RBC-ENTMCNC: 33.9 G/DL — SIGNIFICANT CHANGE UP (ref 32–37)
MCV RBC AUTO: 88.6 FL — SIGNIFICANT CHANGE UP (ref 80–94)
MCV RBC AUTO: 90.1 FL — SIGNIFICANT CHANGE UP (ref 80–94)
MCV RBC AUTO: 90.4 FL — SIGNIFICANT CHANGE UP (ref 80–94)
MCV RBC AUTO: 91.3 FL — SIGNIFICANT CHANGE UP (ref 80–94)
MONOCYTES # BLD AUTO: 0.72 K/UL — HIGH (ref 0.1–0.6)
MONOCYTES # BLD AUTO: 0.84 K/UL — HIGH (ref 0.1–0.6)
MONOCYTES NFR BLD AUTO: 7.4 % — SIGNIFICANT CHANGE UP (ref 1.7–9.3)
MONOCYTES NFR BLD AUTO: 8.4 % — SIGNIFICANT CHANGE UP (ref 1.7–9.3)
NEUTROPHILS # BLD AUTO: 4.57 K/UL — SIGNIFICANT CHANGE UP (ref 1.4–6.5)
NEUTROPHILS # BLD AUTO: 6.79 K/UL — HIGH (ref 1.4–6.5)
NEUTROPHILS NFR BLD AUTO: 53.2 % — SIGNIFICANT CHANGE UP (ref 42.2–75.2)
NEUTROPHILS NFR BLD AUTO: 59.7 % — SIGNIFICANT CHANGE UP (ref 42.2–75.2)
NRBC # BLD: 0 /100 WBCS — SIGNIFICANT CHANGE UP (ref 0–0)
PLATELET # BLD AUTO: 148 K/UL — SIGNIFICANT CHANGE UP (ref 130–400)
PLATELET # BLD AUTO: 161 K/UL — SIGNIFICANT CHANGE UP (ref 130–400)
PLATELET # BLD AUTO: 174 K/UL — SIGNIFICANT CHANGE UP (ref 130–400)
PLATELET # BLD AUTO: 189 K/UL — SIGNIFICANT CHANGE UP (ref 130–400)
POTASSIUM SERPL-MCNC: 3.9 MMOL/L — SIGNIFICANT CHANGE UP (ref 3.5–5)
POTASSIUM SERPL-SCNC: 3.9 MMOL/L — SIGNIFICANT CHANGE UP (ref 3.5–5)
PROCALCITONIN SERPL-MCNC: 0.11 NG/ML — HIGH (ref 0.02–0.1)
PROT SERPL-MCNC: 3.8 G/DL — LOW (ref 6–8)
RBC # BLD: 2.81 M/UL — LOW (ref 4.7–6.1)
RBC # BLD: 3.03 M/UL — LOW (ref 4.7–6.1)
RBC # BLD: 3.16 M/UL — LOW (ref 4.7–6.1)
RBC # BLD: 3.23 M/UL — LOW (ref 4.7–6.1)
RBC # FLD: 14.1 % — SIGNIFICANT CHANGE UP (ref 11.5–14.5)
RBC # FLD: 14.6 % — HIGH (ref 11.5–14.5)
RBC # FLD: 14.8 % — HIGH (ref 11.5–14.5)
RBC # FLD: 14.9 % — HIGH (ref 11.5–14.5)
SARS-COV-2 IGG+IGM SERPL QL IA: 0.2 INDEX — SIGNIFICANT CHANGE UP
SARS-COV-2 IGG+IGM SERPL QL IA: >250 U/ML — HIGH
SARS-COV-2 IGG+IGM SERPL QL IA: NEGATIVE — SIGNIFICANT CHANGE UP
SARS-COV-2 IGG+IGM SERPL QL IA: POSITIVE
SODIUM SERPL-SCNC: 136 MMOL/L — SIGNIFICANT CHANGE UP (ref 135–146)
WBC # BLD: 10.34 K/UL — SIGNIFICANT CHANGE UP (ref 4.8–10.8)
WBC # BLD: 11.37 K/UL — HIGH (ref 4.8–10.8)
WBC # BLD: 8.41 K/UL — SIGNIFICANT CHANGE UP (ref 4.8–10.8)
WBC # BLD: 8.59 K/UL — SIGNIFICANT CHANGE UP (ref 4.8–10.8)
WBC # FLD AUTO: 10.34 K/UL — SIGNIFICANT CHANGE UP (ref 4.8–10.8)
WBC # FLD AUTO: 11.37 K/UL — HIGH (ref 4.8–10.8)
WBC # FLD AUTO: 8.41 K/UL — SIGNIFICANT CHANGE UP (ref 4.8–10.8)
WBC # FLD AUTO: 8.59 K/UL — SIGNIFICANT CHANGE UP (ref 4.8–10.8)

## 2021-11-06 PROCEDURE — 99233 SBSQ HOSP IP/OBS HIGH 50: CPT

## 2021-11-06 PROCEDURE — 99291 CRITICAL CARE FIRST HOUR: CPT

## 2021-11-06 RX ORDER — METRONIDAZOLE 500 MG
TABLET ORAL
Refills: 0 | Status: DISCONTINUED | OUTPATIENT
Start: 2021-11-06 | End: 2021-11-09

## 2021-11-06 RX ORDER — CIPROFLOXACIN LACTATE 400MG/40ML
VIAL (ML) INTRAVENOUS
Refills: 0 | Status: DISCONTINUED | OUTPATIENT
Start: 2021-11-06 | End: 2021-11-09

## 2021-11-06 RX ORDER — CIPROFLOXACIN LACTATE 400MG/40ML
400 VIAL (ML) INTRAVENOUS ONCE
Refills: 0 | Status: COMPLETED | OUTPATIENT
Start: 2021-11-06 | End: 2021-11-06

## 2021-11-06 RX ORDER — SOD SULF/SODIUM/NAHCO3/KCL/PEG
4000 SOLUTION, RECONSTITUTED, ORAL ORAL ONCE
Refills: 0 | Status: DISCONTINUED | OUTPATIENT
Start: 2021-11-06 | End: 2021-11-06

## 2021-11-06 RX ORDER — MAGNESIUM SULFATE 500 MG/ML
2 VIAL (ML) INJECTION
Refills: 0 | Status: COMPLETED | OUTPATIENT
Start: 2021-11-06 | End: 2021-11-06

## 2021-11-06 RX ORDER — MORPHINE SULFATE 50 MG/1
2 CAPSULE, EXTENDED RELEASE ORAL ONCE
Refills: 0 | Status: DISCONTINUED | OUTPATIENT
Start: 2021-11-06 | End: 2021-11-06

## 2021-11-06 RX ORDER — METRONIDAZOLE 500 MG
500 TABLET ORAL ONCE
Refills: 0 | Status: COMPLETED | OUTPATIENT
Start: 2021-11-06 | End: 2021-11-06

## 2021-11-06 RX ORDER — METRONIDAZOLE 500 MG
500 TABLET ORAL EVERY 8 HOURS
Refills: 0 | Status: DISCONTINUED | OUTPATIENT
Start: 2021-11-07 | End: 2021-11-09

## 2021-11-06 RX ORDER — SOD SULF/SODIUM/NAHCO3/KCL/PEG
4000 SOLUTION, RECONSTITUTED, ORAL ORAL ONCE
Refills: 0 | Status: COMPLETED | OUTPATIENT
Start: 2021-11-06 | End: 2021-11-06

## 2021-11-06 RX ORDER — CIPROFLOXACIN LACTATE 400MG/40ML
400 VIAL (ML) INTRAVENOUS EVERY 12 HOURS
Refills: 0 | Status: DISCONTINUED | OUTPATIENT
Start: 2021-11-07 | End: 2021-11-09

## 2021-11-06 RX ORDER — MORPHINE SULFATE 50 MG/1
2 CAPSULE, EXTENDED RELEASE ORAL EVERY 6 HOURS
Refills: 0 | Status: DISCONTINUED | OUTPATIENT
Start: 2021-11-06 | End: 2021-11-06

## 2021-11-06 RX ORDER — LANOLIN ALCOHOL/MO/W.PET/CERES
3 CREAM (GRAM) TOPICAL AT BEDTIME
Refills: 0 | Status: DISCONTINUED | OUTPATIENT
Start: 2021-11-06 | End: 2021-11-11

## 2021-11-06 RX ADMIN — OXYCODONE AND ACETAMINOPHEN 2 TABLET(S): 5; 325 TABLET ORAL at 21:00

## 2021-11-06 RX ADMIN — ONDANSETRON 4 MILLIGRAM(S): 8 TABLET, FILM COATED ORAL at 22:14

## 2021-11-06 RX ADMIN — SERTRALINE 100 MILLIGRAM(S): 25 TABLET, FILM COATED ORAL at 12:47

## 2021-11-06 RX ADMIN — SIMVASTATIN 20 MILLIGRAM(S): 20 TABLET, FILM COATED ORAL at 21:13

## 2021-11-06 RX ADMIN — MORPHINE SULFATE 2 MILLIGRAM(S): 50 CAPSULE, EXTENDED RELEASE ORAL at 21:45

## 2021-11-06 RX ADMIN — OXYCODONE AND ACETAMINOPHEN 2 TABLET(S): 5; 325 TABLET ORAL at 19:56

## 2021-11-06 RX ADMIN — Medication 25 GRAM(S): at 08:17

## 2021-11-06 RX ADMIN — MORPHINE SULFATE 2 MILLIGRAM(S): 50 CAPSULE, EXTENDED RELEASE ORAL at 21:13

## 2021-11-06 RX ADMIN — OXYCODONE AND ACETAMINOPHEN 2 TABLET(S): 5; 325 TABLET ORAL at 08:43

## 2021-11-06 RX ADMIN — OXYCODONE AND ACETAMINOPHEN 2 TABLET(S): 5; 325 TABLET ORAL at 08:41

## 2021-11-06 RX ADMIN — CHLORHEXIDINE GLUCONATE 1 APPLICATION(S): 213 SOLUTION TOPICAL at 07:20

## 2021-11-06 RX ADMIN — Medication 25 GRAM(S): at 08:16

## 2021-11-06 RX ADMIN — Medication 4000 MILLILITER(S): at 10:48

## 2021-11-06 RX ADMIN — Medication 3 MILLIGRAM(S): at 23:33

## 2021-11-06 RX ADMIN — Medication 200 MILLIGRAM(S): at 19:56

## 2021-11-06 RX ADMIN — Medication 100 MILLIGRAM(S): at 19:56

## 2021-11-06 NOTE — PROGRESS NOTE ADULT - SUBJECTIVE AND OBJECTIVE BOX
Patient is a 61y old  Male who presents with a chief complaint of Rectal bleed (2021 14:21)    HPI:  60 yo M PMHx of diverticulitis s/p partial colon resection 2019 at [place], HTN, DLD transferred from [facility] due to BRBPR x1d.   Per wife at bedside, states pt began to feel LLQ pain one week ago, contacted Dr Nava who prescribed a course of two antibiotics (does not recall names), pt completed abx course with no improvement in pain. Yesterday experienced BRBPR x4, went to [facility], CT A/P without abnormalities and stable Hb so was d/c home. BRBPR continued, [place], given prbc x2 and transferred to [place]. Hypotensive on admission, 60/40, code fusion called for initiation of MTP. Pt is s/p 2 additional units prbcs, platelet x1 and calcium gluconate x1, 2L LR bolus. BP improved to MAP 67 and pt did not need pressor support. CTA A/P showing active intraluminal contrast extravasation in the distal descending colon/proximal sigmoid, compatible with active intestinal hemorrhage. IR c/s for embolization.   ED VS, T(F) Max: 98.5 HR: 63 BP: 89/53 MAP 66 RR: 18 SpO2: 99% in RA. Labs notable for Hb 12.4 <- 14.6. CXR unremarkable.     Admitted for hypovolemic shock due to ongoing intestinal hemorrhage.      (2021 03:49)       INTERVAL HPI/OVERNIGHT EVENTS:   Hematochezia  Afebrile, hemodynamically stable     Subjective: Pt seen and evaluated at bedside. he continues to have maroon color bowel movement.     ICU Vital Signs Last 24 Hrs  T(C): 36.9 (2021 16:00), Max: 37.2 (2021 20:00)  T(F): 98.4 (2021 16:00), Max: 98.9 (2021 20:00)  HR: 79 (2021 16:00) (65 - 85)  BP: 171/81 (2021 16:00) (103/- - 171/81)  BP(mean): 115 (2021 16:00) (81 - 115)  ABP: 124/105 (2021 16:00) (104/76 - 144/121)  ABP(mean): 116 (2021 16:00) (88 - 132)  RR: 13 (2021 16:00) (10 - 22)  SpO2: 97% (2021 16:00) (94% - 99%)    I&O's Summary    2021 07:01  -  2021 07:00  --------------------------------------------------------  IN: 2536 mL / OUT: 1500 mL / NET: 1036 mL    2021 08:01  -  2021 17:31  --------------------------------------------------------  IN: 1100 mL / OUT: 600 mL / NET: 500 mL          Daily     Daily Weight in k (2021 04:00)    Adult Advanced Hemodynamics Last 24 Hrs  CVP(mm Hg): --  CVP(cm H2O): --  CO: --  CI: --  PA: --  PA(mean): --  PCWP: --  SVR: --  SVRI: --  PVR: --  PVRI: --    EKG/Telemetry Events:    MEDICATIONS  (STANDING):  chlorhexidine 4% Liquid 1 Application(s) Topical <User Schedule>  influenza   Vaccine 0.5 milliLiter(s) IntraMuscular once  lactated ringers. 1000 milliLiter(s) (100 mL/Hr) IV Continuous <Continuous>  ondansetron Injectable 4 milliGRAM(s) IV Push every 6 hours  sertraline 100 milliGRAM(s) Oral daily  simvastatin 20 milliGRAM(s) Oral at bedtime    MEDICATIONS  (PRN):  acetaminophen     Tablet .. 650 milliGRAM(s) Oral every 6 hours PRN Temp greater or equal to 38C (100.4F), Mild Pain (1 - 3)  morphine  - Injectable 2 milliGRAM(s) IV Push every 6 hours PRN Severe Pain (7 - 10)  oxycodone    5 mG/acetaminophen 325 mG 2 Tablet(s) Oral every 6 hours PRN Moderate Pain (4 - 6)      PHYSICAL EXAM:  GENERAL: Obese male laying in bed  HEAD:  Atraumatic, Normocephalic  EYES: EOMI, PERRLA, conjunctiva and sclera clear  NECK: Supple, No JVD, Normal thyroid, no enlarged nodes  NERVOUS SYSTEM:  Alert & Awake.   CHEST/LUNG: B/L good air entry; No rales, rhonchi, or wheezing  HEART: S1S2 normal, no S3, Regular rate and rhythm; No murmurs  ABDOMEN: Soft, Nontender, Nondistended; Bowel sounds present  EXTREMITIES:  LUE edema  LYMPH: No lymphadenopathy noted  SKIN: No rashes or lesions    LABS:                        8.4    8.41  )-----------( 148      ( 2021 14:48 )             25.4     11-    136  |  108  |  12  ----------------------------<  101<H>  3.9   |  17  |  0.7    Ca    7.3<L>      2021 05:05  Mg     1.6     -    TPro  3.8<L>  /  Alb  2.5<L>  /  TBili  0.7  /  DBili  x   /  AST  10  /  ALT  15  /  AlkPhos  43  11-06    LIVER FUNCTIONS - ( 2021 05:05 )  Alb: 2.5 g/dL / Pro: 3.8 g/dL / ALK PHOS: 43 U/L / ALT: 15 U/L / AST: 10 U/L / GGT: x           PT/INR - ( 2021 11:40 )   PT: 14.40 sec;   INR: 1.25 ratio         PTT - ( 2021 11:40 )  PTT:26.2 sec  CAPILLARY BLOOD GLUCOSE            CARDIAC MARKERS ( 2021 11:40 )  x     / <0.01 ng/mL / x     / x     / x                RADIOLOGY & ADDITIONAL TESTS:  CXR:        Care Discussed with Consultants/Other Providers [ x] YES  [ ] NO

## 2021-11-06 NOTE — PROGRESS NOTE ADULT - ASSESSMENT
IMPRESSION:  Hypovolemic shock / Hemorrhagic shock   LGIB   HO Diverticulitis SP partial large bowel resection    HO HTN      PLAN:    CNS: Avoid Sedatives    HEENT: Oral care, HOB at 45    PULMONARY: Aspiration precautions    CARDIOVASCULAR: Keep MAP 60, Echo.  CE     GI: GI prophylaxis, follow GI recall now ?> still bleeding for colonoscopy seen BY IR no embolization     RENAL: I=0, monitor electrolytes and replete as needed,  IV hydration LR @100cc/hrs   replace mg     INFECTIOUS DISEASE: Monitor OFF ABX. procal, bl cx      HEMATOLOGICAL:  SCD, Keep Hb above 8, Active type and screen. CBC Q6H and Coags.  DIMEr   Seriel cbc  keep HB more then 8.5   ENDOCRINE:  Follow up FS.  Insulin protocol if needed.    MUSCULOSKELETAL: Bed rest     FULL CODE   MICU       IMPRESSION:  Hypovolemic shock / Hemorrhagic shock   LGIB   HO Diverticulitis SP partial large bowel resection    HO HTN      PLAN:    CNS: Avoid Sedatives    HEENT: Oral care, HOB at 45    PULMONARY: Aspiration precautions    CARDIOVASCULAR: Keep MAP 60, Echo.  CE     GI: GI prophylaxis, follow GI recall now ?> still bleeding for colonoscopy seen BY IR no embolization     RENAL: I=0, monitor electrolytes and replete as needed,  IV hydration LR @100cc/hrs   replace mg     INFECTIOUS DISEASE: Monitor OFF ABX. procal, bl cx      HEMATOLOGICAL:  SCD, Keep Hb above 8, Active type and screen. CBC Q6H and Coags.  DIMEr   Seriel cbc  keep HB more then 8.5   ENDOCRINE:  Follow up FS.  Insulin protocol if needed.    MUSCULOSKELETAL: Bed rest     FULL CODE   MICU  case discussed with GI recommend prep patient to repeat scope today   case discussed with wife and patient

## 2021-11-06 NOTE — PROGRESS NOTE ADULT - PROVIDER SPECIALTY LIST ADULT
I have attempted to contact this patient by phone to return their call to review ultrasound results, but there is no response.  Message left and call back number given. Will try again next week.   Pulmonology

## 2021-11-06 NOTE — PROGRESS NOTE ADULT - SUBJECTIVE AND OBJECTIVE BOX
Gastroenterology progress note:     Patient is a 61y old  Male who presents with a chief complaint of Rectal bleed (06 Nov 2021 09:26)       Admitted on: 11-05-21    We are following the patient for: LGIB     Interval History:  Patient had 1 episode of maroon coloured stool this morning, BP stable, No abdominal pain.      PAST MEDICAL & SURGICAL HISTORY:  Diverticulitis    HTN (hypertension)    HLD (hyperlipidemia)    H/O left knee surgery    S/P partial colectomy  2019        MEDICATIONS  (STANDING):  chlorhexidine 4% Liquid 1 Application(s) Topical <User Schedule>  influenza   Vaccine 0.5 milliLiter(s) IntraMuscular once  lactated ringers. 1000 milliLiter(s) (100 mL/Hr) IV Continuous <Continuous>  ondansetron Injectable 4 milliGRAM(s) IV Push every 6 hours  sertraline 100 milliGRAM(s) Oral daily  simvastatin 20 milliGRAM(s) Oral at bedtime    MEDICATIONS  (PRN):  acetaminophen     Tablet .. 650 milliGRAM(s) Oral every 6 hours PRN Temp greater or equal to 38C (100.4F), Mild Pain (1 - 3)  morphine  - Injectable 2 milliGRAM(s) IV Push every 6 hours PRN Severe Pain (7 - 10)  oxycodone    5 mG/acetaminophen 325 mG 2 Tablet(s) Oral every 6 hours PRN Moderate Pain (4 - 6)      Allergies  No Known Allergies      Review of Systems:   Constitutional: Ne Fever, No chills, No weakness  ENT: No visual changes, No throat pain  Cardiovascular:  No Chest Pain, No Palpitations  Respiratory:  No Cough, No Dyspnea  Gastrointestinal:  As described in HPI  Neurological: No numbness or weakness  Skin: No rash, no itching    Physical Examination:  T(C): 36.7 (11-06-21 @ 12:00), Max: 37.2 (11-05-21 @ 20:00)  HR: 79 (11-06-21 @ 14:00) (64 - 85)  BP: 129/74 (11-06-21 @ 12:00) (103/- - 147/68)  RR: 16 (11-06-21 @ 14:00) (10 - 22)  SpO2: 98% (11-06-21 @ 14:00) (94% - 99%)  Weight (kg): 127 (11-05-21 @ 16:52)    11-05-21 @ 07:01  -  11-06-21 @ 07:00  --------------------------------------------------------  IN: 2536 mL / OUT: 1500 mL / NET: 1036 mL    11-06-21 @ 08:01  -  11-06-21 @ 14:21  --------------------------------------------------------  IN: 900 mL / OUT: 600 mL / NET: 300 mL      Constitutional: No acute distress.  Respiratory:  No signs of respiratory distress. Lung sounds are clear bilaterally.  Cardiovascular:  S1 S2, Regular rate and rhythm.  Abdominal: Abdomen is soft, symmetric, and non-tender without distention. There are no visible lesions. Bowel sounds are present and normoactive in all four quadrants. No masses, hepatomegaly, or splenomegaly are noted.   Skin: No rashes, No Jaundice.  Neurology: AAOX3, Non-focal  Skin: No rash, No excoriation  Vascular: No varicose vein, No cyanosis, No edema        Data: (reviewed by attending)                        9.2    8.59  )-----------( 161      ( 06 Nov 2021 05:05 )             27.3     Hgb trend:  9.2  11-06-21 @ 05:05  9.8  11-06-21 @ 00:20  10.1  11-05-21 @ 21:25  11.2  11-05-21 @ 11:40  12.4  11-05-21 @ 04:01  13.2  11-05-21 @ 01:00  14.6  11-03-21 @ 21:50        11-06    136  |  108  |  12  ----------------------------<  101<H>  3.9   |  17  |  0.7    Ca    7.3<L>      06 Nov 2021 05:05  Mg     1.6     11-06    TPro  3.8<L>  /  Alb  2.5<L>  /  TBili  0.7  /  DBili  x   /  AST  10  /  ALT  15  /  AlkPhos  43  11-06    Liver panel trend:  TBili 0.7   /   AST 10   /   ALT 15   /   AlkP 43   /   Tptn 3.8   /   Alb 2.5    /   DBili --      11-06  TBili 1.1   /   AST 15   /   ALT 21   /   AlkP 55   /   Tptn 4.5   /   Alb 3.0    /   DBili --      11-05  TBili 0.5   /   AST 21   /   ALT 33   /   AlkP 77   /   Tptn 5.9   /   Alb 3.7    /   DBili --      11-05  TBili 0.5   /   AST 26   /   ALT 40   /   AlkP 86   /   Tptn 6.8   /   Alb 4.5    /   DBili <0.2      11-03      PT/INR - ( 05 Nov 2021 11:40 )   PT: 14.40 sec;   INR: 1.25 ratio         PTT - ( 05 Nov 2021 11:40 )  PTT:26.2 sec       Radiology: (reviewed by attending)

## 2021-11-06 NOTE — PROGRESS NOTE ADULT - ASSESSMENT
60 yo M PMHx of diverticulitis s/p partial colon resection 2019 (left hemicolectomy) at [place], HTN, [place] BRBPR x1d.     #)Lower GI bleed secondary to likely diverticulosis  -Hemodynamically unstable at the time of admission s/p 4 units PRBC, 1 unit FFP  -CTA positive for arterial extravasation in the distal descending colon/proximal sigmoid  -s/p IR angiogram 11/5 DALJIT and selective sigmoid angiography without evidence of active contrast extravasation  -Stabilised after 4 units PRBC and IVF  -s/p flex sigmoidoscopy after mag citrate and enema prep 11/5 with no evidence of active bleeding, showed severe diverticulosis  -Hemodynamically stable now  -Baseline Hb 12-13, current Hb 9.2  - one large maroon coloured stool today --> ? remnant of blood or active bleeding    Recs:   Trend CBC BID, Keep Hb>8  2 18 gauge  active type and screen   Give 4 L Golytely--> will monitor BM--> will consider flex sig if any signs of active GI bleed

## 2021-11-06 NOTE — PROGRESS NOTE ADULT - SUBJECTIVE AND OBJECTIVE BOX
Patient is a 61y old  Male who presents with a chief complaint of Rectal bleed (05 Nov 2021 23:01)      Over Night Events:  Patient seen and examined.   s/p IR angio not able to embolize no active bleeding seen   s/p sigmoidoscopy diverticulosis   still has melena     ROS:  See HPI    PHYSICAL EXAM    ICU Vital Signs Last 24 Hrs  T(C): 36.7 (06 Nov 2021 08:00), Max: 37.2 (05 Nov 2021 20:00)  T(F): 98 (06 Nov 2021 08:00), Max: 98.9 (05 Nov 2021 20:00)  HR: 76 (06 Nov 2021 08:00) (59 - 85)  BP: 128/60 (06 Nov 2021 08:00) (103/- - 137/75)  BP(mean): 87 (06 Nov 2021 08:00) (80 - 100)  ABP: 105/74 (06 Nov 2021 08:00) (87/59 - 134/78)  ABP(mean): 88 (06 Nov 2021 08:00) (67 - 93)  RR: 13 (06 Nov 2021 08:00) (12 - 22)  SpO2: 97% (06 Nov 2021 08:00) (94% - 100%)      General: awake   HEENT:    darryl            Lymph Nodes: NO cervical LN   Lungs: Bilateral BS  Cardiovascular: Regular   Abdomen: Soft, Positive BS  Extremities: No clubbing   Skin: warm   Neurological: no focal deficit   Musculoskeletal: move all ext     I&O's Detail    05 Nov 2021 07:01  -  06 Nov 2021 07:00  --------------------------------------------------------  IN:    Lactated Ringers: 2000 mL    Oral Fluid: 536 mL  Total IN: 2536 mL    OUT:    Voided (mL): 1500 mL  Total OUT: 1500 mL    Total NET: 1036 mL      06 Nov 2021 08:01  -  06 Nov 2021 09:27  --------------------------------------------------------  IN:    Lactated Ringers: 300 mL  Total IN: 300 mL    OUT:  Total OUT: 0 mL    Total NET: 300 mL          LABS:                          9.2    8.59  )-----------( 161      ( 06 Nov 2021 05:05 )             27.3         06 Nov 2021 05:05    136    |  108    |  12     ----------------------------<  101    3.9     |  17     |  0.7      Ca    7.3        06 Nov 2021 05:05  Mg     1.6       06 Nov 2021 05:05    TPro  3.8    /  Alb  2.5    /  TBili  0.7    /  DBili  x      /  AST  10     /  ALT  15     /  AlkPhos  43     06 Nov 2021 05:05  Amylase x     lipase x                                                 PT/INR - ( 05 Nov 2021 11:40 )   PT: 14.40 sec;   INR: 1.25 ratio         PTT - ( 05 Nov 2021 11:40 )  PTT:26.2 sec                                             CARDIAC MARKERS ( 05 Nov 2021 11:40 )  x     / <0.01 ng/mL / x     / x     / x                                                                                                                                                 MEDICATIONS  (STANDING):  chlorhexidine 4% Liquid 1 Application(s) Topical <User Schedule>  influenza   Vaccine 0.5 milliLiter(s) IntraMuscular once  lactated ringers. 1000 milliLiter(s) (100 mL/Hr) IV Continuous <Continuous>  norepinephrine Infusion 0.05 MICROgram(s)/kG/Min (11.9 mL/Hr) IV Continuous <Continuous>  ondansetron Injectable 4 milliGRAM(s) IV Push every 6 hours  sertraline 100 milliGRAM(s) Oral daily  simvastatin 20 milliGRAM(s) Oral at bedtime    MEDICATIONS  (PRN):  acetaminophen     Tablet .. 650 milliGRAM(s) Oral every 6 hours PRN Temp greater or equal to 38C (100.4F), Mild Pain (1 - 3)  morphine  - Injectable 2 milliGRAM(s) IV Push every 6 hours PRN Severe Pain (7 - 10)  oxycodone    5 mG/acetaminophen 325 mG 2 Tablet(s) Oral every 6 hours PRN Moderate Pain (4 - 6)          Xrays:  TLC:  OG:  ET tube:                                                                                       ECHO:  CAM ICU:

## 2021-11-06 NOTE — PROGRESS NOTE ADULT - ASSESSMENT
IMPRESSION:  Hypovolemic shock / Hemorrhagic shock   LGIB   HO Diverticulitis SP partial large bowel resection    HO HTN      PLAN:    CNS: Avoid Sedatives    HEENT: Oral care, HOB at 45    PULMONARY: Aspiration precautions    CARDIOVASCULAR: Keep MAP 60, Echo.  CE     GI: GI prophylaxis, follow GI recall now ?> still bleeding for colonoscopy seen BY IR no embolization     RENAL: I=0, monitor electrolytes and replete as needed,  IV hydration LR @100cc/hrs   replace mg     INFECTIOUS DISEASE: Monitor OFF ABX. procal, bl cx      HEMATOLOGICAL:  SCD, Keep Hb above 8, Active type and screen. CBC Q6H and Coags.  DIMEr   Seriel cbc  keep HB more then 8.5   ENDOCRINE:  Follow up FS.  Insulin protocol if needed.    MUSCULOSKELETAL: Bed rest     FULL CODE   MICU  case discussed with GI recommend prep patient to repeat scope today   case discussed with wife and patient

## 2021-11-07 LAB
ALBUMIN SERPL ELPH-MCNC: 3.6 G/DL — SIGNIFICANT CHANGE UP (ref 3.5–5.2)
ALP SERPL-CCNC: 62 U/L — SIGNIFICANT CHANGE UP (ref 30–115)
ALT FLD-CCNC: 20 U/L — SIGNIFICANT CHANGE UP (ref 0–41)
ANION GAP SERPL CALC-SCNC: 13 MMOL/L — SIGNIFICANT CHANGE UP (ref 7–14)
APTT BLD: 27.6 SEC — SIGNIFICANT CHANGE UP (ref 27–39.2)
AST SERPL-CCNC: 15 U/L — SIGNIFICANT CHANGE UP (ref 0–41)
BASOPHILS # BLD AUTO: 0.05 K/UL — SIGNIFICANT CHANGE UP (ref 0–0.2)
BASOPHILS NFR BLD AUTO: 0.5 % — SIGNIFICANT CHANGE UP (ref 0–1)
BILIRUB SERPL-MCNC: 1 MG/DL — SIGNIFICANT CHANGE UP (ref 0.2–1.2)
BUN SERPL-MCNC: 6 MG/DL — LOW (ref 10–20)
CALCIUM SERPL-MCNC: 8.1 MG/DL — LOW (ref 8.5–10.1)
CHLORIDE SERPL-SCNC: 105 MMOL/L — SIGNIFICANT CHANGE UP (ref 98–110)
CO2 SERPL-SCNC: 21 MMOL/L — SIGNIFICANT CHANGE UP (ref 17–32)
CREAT SERPL-MCNC: 0.8 MG/DL — SIGNIFICANT CHANGE UP (ref 0.7–1.5)
EOSINOPHIL # BLD AUTO: 0.24 K/UL — SIGNIFICANT CHANGE UP (ref 0–0.7)
EOSINOPHIL NFR BLD AUTO: 2.6 % — SIGNIFICANT CHANGE UP (ref 0–8)
GLUCOSE SERPL-MCNC: 124 MG/DL — HIGH (ref 70–99)
HCT VFR BLD CALC: 28.6 % — LOW (ref 42–52)
HCT VFR BLD CALC: 28.8 % — LOW (ref 42–52)
HCT VFR BLD CALC: 29.9 % — LOW (ref 42–52)
HGB BLD-MCNC: 9.6 G/DL — LOW (ref 14–18)
HGB BLD-MCNC: 9.6 G/DL — LOW (ref 14–18)
HGB BLD-MCNC: 9.8 G/DL — LOW (ref 14–18)
IMM GRANULOCYTES NFR BLD AUTO: 0.5 % — HIGH (ref 0.1–0.3)
INR BLD: 1.15 RATIO — SIGNIFICANT CHANGE UP (ref 0.65–1.3)
LACTATE SERPL-SCNC: 1.2 MMOL/L — SIGNIFICANT CHANGE UP (ref 0.7–2)
LYMPHOCYTES # BLD AUTO: 2.27 K/UL — SIGNIFICANT CHANGE UP (ref 1.2–3.4)
LYMPHOCYTES # BLD AUTO: 24.6 % — SIGNIFICANT CHANGE UP (ref 20.5–51.1)
MAGNESIUM SERPL-MCNC: 2 MG/DL — SIGNIFICANT CHANGE UP (ref 1.8–2.4)
MCHC RBC-ENTMCNC: 29.9 PG — SIGNIFICANT CHANGE UP (ref 27–31)
MCHC RBC-ENTMCNC: 30.3 PG — SIGNIFICANT CHANGE UP (ref 27–31)
MCHC RBC-ENTMCNC: 30.6 PG — SIGNIFICANT CHANGE UP (ref 27–31)
MCHC RBC-ENTMCNC: 32.8 G/DL — SIGNIFICANT CHANGE UP (ref 32–37)
MCHC RBC-ENTMCNC: 33.3 G/DL — SIGNIFICANT CHANGE UP (ref 32–37)
MCHC RBC-ENTMCNC: 33.6 G/DL — SIGNIFICANT CHANGE UP (ref 32–37)
MCV RBC AUTO: 90.9 FL — SIGNIFICANT CHANGE UP (ref 80–94)
MCV RBC AUTO: 91.1 FL — SIGNIFICANT CHANGE UP (ref 80–94)
MCV RBC AUTO: 91.2 FL — SIGNIFICANT CHANGE UP (ref 80–94)
MONOCYTES # BLD AUTO: 0.63 K/UL — HIGH (ref 0.1–0.6)
MONOCYTES NFR BLD AUTO: 6.8 % — SIGNIFICANT CHANGE UP (ref 1.7–9.3)
NEUTROPHILS # BLD AUTO: 5.97 K/UL — SIGNIFICANT CHANGE UP (ref 1.4–6.5)
NEUTROPHILS NFR BLD AUTO: 65 % — SIGNIFICANT CHANGE UP (ref 42.2–75.2)
NRBC # BLD: 0 /100 WBCS — SIGNIFICANT CHANGE UP (ref 0–0)
PLATELET # BLD AUTO: 163 K/UL — SIGNIFICANT CHANGE UP (ref 130–400)
PLATELET # BLD AUTO: 178 K/UL — SIGNIFICANT CHANGE UP (ref 130–400)
PLATELET # BLD AUTO: 193 K/UL — SIGNIFICANT CHANGE UP (ref 130–400)
POTASSIUM SERPL-MCNC: 3.5 MMOL/L — SIGNIFICANT CHANGE UP (ref 3.5–5)
POTASSIUM SERPL-SCNC: 3.5 MMOL/L — SIGNIFICANT CHANGE UP (ref 3.5–5)
PROT SERPL-MCNC: 5.3 G/DL — LOW (ref 6–8)
PROTHROM AB SERPL-ACNC: 13.2 SEC — HIGH (ref 9.95–12.87)
RBC # BLD: 3.14 M/UL — LOW (ref 4.7–6.1)
RBC # BLD: 3.17 M/UL — LOW (ref 4.7–6.1)
RBC # BLD: 3.28 M/UL — LOW (ref 4.7–6.1)
RBC # FLD: 13.6 % — SIGNIFICANT CHANGE UP (ref 11.5–14.5)
RBC # FLD: 13.7 % — SIGNIFICANT CHANGE UP (ref 11.5–14.5)
RBC # FLD: 13.8 % — SIGNIFICANT CHANGE UP (ref 11.5–14.5)
SODIUM SERPL-SCNC: 139 MMOL/L — SIGNIFICANT CHANGE UP (ref 135–146)
WBC # BLD: 11.38 K/UL — HIGH (ref 4.8–10.8)
WBC # BLD: 8.77 K/UL — SIGNIFICANT CHANGE UP (ref 4.8–10.8)
WBC # BLD: 9.21 K/UL — SIGNIFICANT CHANGE UP (ref 4.8–10.8)
WBC # FLD AUTO: 11.38 K/UL — HIGH (ref 4.8–10.8)
WBC # FLD AUTO: 8.77 K/UL — SIGNIFICANT CHANGE UP (ref 4.8–10.8)
WBC # FLD AUTO: 9.21 K/UL — SIGNIFICANT CHANGE UP (ref 4.8–10.8)

## 2021-11-07 PROCEDURE — 45378 DIAGNOSTIC COLONOSCOPY: CPT

## 2021-11-07 PROCEDURE — 99233 SBSQ HOSP IP/OBS HIGH 50: CPT

## 2021-11-07 RX ORDER — PANTOPRAZOLE SODIUM 20 MG/1
40 TABLET, DELAYED RELEASE ORAL EVERY 12 HOURS
Refills: 0 | Status: DISCONTINUED | OUTPATIENT
Start: 2021-11-07 | End: 2021-11-11

## 2021-11-07 RX ADMIN — PANTOPRAZOLE SODIUM 40 MILLIGRAM(S): 20 TABLET, DELAYED RELEASE ORAL at 11:02

## 2021-11-07 RX ADMIN — PANTOPRAZOLE SODIUM 40 MILLIGRAM(S): 20 TABLET, DELAYED RELEASE ORAL at 17:51

## 2021-11-07 RX ADMIN — Medication 100 MILLIGRAM(S): at 05:01

## 2021-11-07 RX ADMIN — SERTRALINE 100 MILLIGRAM(S): 25 TABLET, FILM COATED ORAL at 11:03

## 2021-11-07 RX ADMIN — Medication 3 MILLIGRAM(S): at 21:47

## 2021-11-07 RX ADMIN — SIMVASTATIN 20 MILLIGRAM(S): 20 TABLET, FILM COATED ORAL at 21:47

## 2021-11-07 RX ADMIN — Medication 200 MILLIGRAM(S): at 06:01

## 2021-11-07 RX ADMIN — Medication 100 MILLIGRAM(S): at 14:56

## 2021-11-07 RX ADMIN — Medication 200 MILLIGRAM(S): at 17:51

## 2021-11-07 RX ADMIN — CHLORHEXIDINE GLUCONATE 1 APPLICATION(S): 213 SOLUTION TOPICAL at 05:02

## 2021-11-07 RX ADMIN — OXYCODONE AND ACETAMINOPHEN 2 TABLET(S): 5; 325 TABLET ORAL at 05:01

## 2021-11-07 RX ADMIN — Medication 100 MILLIGRAM(S): at 21:47

## 2021-11-07 RX ADMIN — OXYCODONE AND ACETAMINOPHEN 2 TABLET(S): 5; 325 TABLET ORAL at 05:30

## 2021-11-07 RX ADMIN — OXYCODONE AND ACETAMINOPHEN 2 TABLET(S): 5; 325 TABLET ORAL at 16:09

## 2021-11-07 RX ADMIN — OXYCODONE AND ACETAMINOPHEN 2 TABLET(S): 5; 325 TABLET ORAL at 15:01

## 2021-11-07 NOTE — PROGRESS NOTE ADULT - ASSESSMENT
Assessment	  IMPRESSION:  Hypovolemic shock / Hemorrhagic shock   LGIB   HO Diverticulitis SP partial large bowel resection    HO HTN      PLAN:    CNS: Avoid Sedatives    HEENT: Oral care, HOB at 45    PULMONARY: Aspiration precautions    CARDIOVASCULAR: Keep MAP 60, Echo.  CE     GI: GI prophylaxis, follow GI recall now ?> still bleeding for colonoscopy seen BY IR no embolization     RENAL: I=0, monitor electrolytes and replete as needed,  IV hydration LR @100cc/hrs   replace mg     INFECTIOUS DISEASE: Monitor OFF ABX. procal, bl cx      HEMATOLOGICAL:  SCD, Keep Hb above 8, Active type and screen. CBC Q6H and Coags.  DIMEr   Seriel cbc  keep HB more then 8.5   ENDOCRINE:  Follow up FS.  Insulin protocol if needed.    MUSCULOSKELETAL: Bed rest     FULL CODE   MICU

## 2021-11-07 NOTE — PROGRESS NOTE ADULT - SUBJECTIVE AND OBJECTIVE BOX
Patient is a 61y old  Male who presents with a chief complaint of Rectal bleed (07 Nov 2021 03:47)      Over Night Events:  Patient seen and examined.   still complaining on left lower quadrant pain     ROS:  See HPI    PHYSICAL EXAM    ICU Vital Signs Last 24 Hrs  T(C): 37.2 (07 Nov 2021 08:00), Max: 37.2 (07 Nov 2021 08:00)  T(F): 98.9 (07 Nov 2021 08:00), Max: 98.9 (07 Nov 2021 08:00)  HR: 79 (07 Nov 2021 08:00) (70 - 85)  BP: 147/76 (07 Nov 2021 08:00) (129/74 - 171/81)  BP(mean): 106 (07 Nov 2021 08:00) (91 - 115)  ABP: 132/87 (06 Nov 2021 18:00) (117/83 - 144/121)  ABP(mean): 107 (06 Nov 2021 18:00) (97 - 132)  RR: 16 (07 Nov 2021 08:00) (10 - 27)  SpO2: 100% (07 Nov 2021 08:00) (93% - 100%)      General: AOx3  HEENT:          darryl      Lymph Nodes: NO cervical LN   Lungs: Bilateral BS  Cardiovascular: Regular   Abdomen: Soft,  tenderness Left lower   Extremities: No clubbing   Skin: warm   Neurological: no focal   Musculoskeletal: move all ext     I&O's Detail    06 Nov 2021 08:01  -  07 Nov 2021 07:00  --------------------------------------------------------  IN:    IV PiggyBack: 500 mL    Lactated Ringers: 2400 mL  Total IN: 2900 mL    OUT:    Voided (mL): 4550 mL  Total OUT: 4550 mL    Total NET: -1650 mL      07 Nov 2021 07:01  -  07 Nov 2021 09:28  --------------------------------------------------------  IN:    Lactated Ringers: 200 mL  Total IN: 200 mL    OUT:    Voided (mL): 700 mL  Total OUT: 700 mL    Total NET: -500 mL          LABS:                          9.6    9.21  )-----------( 163      ( 07 Nov 2021 05:00 )             28.8         07 Nov 2021 05:00    139    |  105    |  6      ----------------------------<  124    3.5     |  21     |  0.8      Ca    8.1        07 Nov 2021 05:00  Mg     2.0       07 Nov 2021 05:00    TPro  5.3    /  Alb  3.6    /  TBili  1.0    /  DBili  x      /  AST  15     /  ALT  20     /  AlkPhos  62     07 Nov 2021 05:00  Amylase x     lipase x                                                 PT/INR - ( 07 Nov 2021 05:00 )   PT: 13.20 sec;   INR: 1.15 ratio         PTT - ( 07 Nov 2021 05:00 )  PTT:27.6 sec                                             CARDIAC MARKERS ( 05 Nov 2021 11:40 )  x     / <0.01 ng/mL / x     / x     / x                                                            Culture - Blood (collected 05 Nov 2021 12:00)  Source: .Blood None  Preliminary Report (06 Nov 2021 23:01):    No growth to date.                                                                                           MEDICATIONS  (STANDING):  chlorhexidine 4% Liquid 1 Application(s) Topical <User Schedule>  ciprofloxacin   IVPB      ciprofloxacin   IVPB 400 milliGRAM(s) IV Intermittent every 12 hours  influenza   Vaccine 0.5 milliLiter(s) IntraMuscular once  lactated ringers. 1000 milliLiter(s) (100 mL/Hr) IV Continuous <Continuous>  melatonin 3 milliGRAM(s) Oral at bedtime  metroNIDAZOLE  IVPB 500 milliGRAM(s) IV Intermittent every 8 hours  metroNIDAZOLE  IVPB      ondansetron Injectable 4 milliGRAM(s) IV Push every 6 hours  sertraline 100 milliGRAM(s) Oral daily  simvastatin 20 milliGRAM(s) Oral at bedtime    MEDICATIONS  (PRN):  acetaminophen     Tablet .. 650 milliGRAM(s) Oral every 6 hours PRN Temp greater or equal to 38C (100.4F), Mild Pain (1 - 3)  oxycodone    5 mG/acetaminophen 325 mG 2 Tablet(s) Oral every 6 hours PRN Moderate Pain (4 - 6)          Xrays:  TLC:  OG:  ET tube:                                                                                       ECHO:  CAM ICU:

## 2021-11-07 NOTE — PROGRESS NOTE ADULT - SUBJECTIVE AND OBJECTIVE BOX
PATIENT:  KRISTINA MCKEON  [id number]    CHIEF COMPLAINT:  Patient is a 61y old  Male who presents with a chief complaint of Rectal bleed (2021 17:30)      INTERVAL HISTORY/OVERNIGHT EVENTS:  NAEO. Patient has been hemodynamically stable. He denies any recent passage of blood, and currently denies any somatic complaints.     REVIEW OF SYSTEMS:    Constitutional:     [ ] negative [ ] fevers [ ] chills [ ] weight loss [ ] weight gain  HEENT:                  [ ] negative [ ] dry eyes [ ] eye irritation [ ] postnasal drip [ ] nasal congestion  CV:                         [ ] negative  [ ] chest pain [ ] orthopnea [ ] palpitations [ ] murmur  Resp:                     [ ] negative [ ] cough [ ] shortness of breath [ ] dyspnea [ ] wheezing [ ] sputum [ ] hemoptysis  GI:                          [ ] negative [ ] nausea [ ] vomiting [ ] diarrhea [ ] constipation [ ] abd pain [ ] dysphagia   :                        [ ] negative [ ] dysuria [ ] nocturia [ ] hematuria [ ] increased urinary frequency  Musculoskeletal: [ ] negative [ ] back pain [ ] myalgias [ ] arthralgias [ ] fracture  Skin:                       [ ] negative [ ] rash [ ] itch  Neurological:        [ ] negative [ ] headache [ ] dizziness [ ] syncope [ ] weakness [ ] numbness  Psychiatric:           [ ] negative [ ] anxiety [ ] depression  Endocrine:            [ ] negative [ ] diabetes [ ] thyroid problem  Heme/Lymph:      [ ] negative [ ] anemia [ ] bleeding problem  Allergic/Immune: [ ] negative [ ] itchy eyes [ ] nasal discharge [ ] hives [ ] angioedema    [ ] All other systems negative  [ ] Unable to assess ROS because ________.  ROS is negative except as noted in interval history      MEDICATIONS:  MEDICATIONS  (STANDING):  chlorhexidine 4% Liquid 1 Application(s) Topical <User Schedule>  ciprofloxacin   IVPB      ciprofloxacin   IVPB 400 milliGRAM(s) IV Intermittent every 12 hours  influenza   Vaccine 0.5 milliLiter(s) IntraMuscular once  lactated ringers. 1000 milliLiter(s) (100 mL/Hr) IV Continuous <Continuous>  melatonin 3 milliGRAM(s) Oral at bedtime  metroNIDAZOLE  IVPB 500 milliGRAM(s) IV Intermittent every 8 hours  metroNIDAZOLE  IVPB      ondansetron Injectable 4 milliGRAM(s) IV Push every 6 hours  sertraline 100 milliGRAM(s) Oral daily  simvastatin 20 milliGRAM(s) Oral at bedtime    MEDICATIONS  (PRN):  acetaminophen     Tablet .. 650 milliGRAM(s) Oral every 6 hours PRN Temp greater or equal to 38C (100.4F), Mild Pain (1 - 3)  oxycodone    5 mG/acetaminophen 325 mG 2 Tablet(s) Oral every 6 hours PRN Moderate Pain (4 - 6)      ALLERGIES:  Allergies    No Known Allergies    Intolerances        OBJECTIVE:  ICU Vital Signs Last 24 Hrs  T(C): 36.6 (2021 01:00), Max: 37 (2021 20:00)  T(F): 97.9 (2021 01:00), Max: 98.6 (2021 20:00)  HR: 80 (2021 03:00) (69 - 85)  BP: 135/65 (2021 03:00) (112/58 - 171/81)  BP(mean): 93 (2021 03:00) (82 - 115)  ABP: 132/87 (2021 18:00) (105/74 - 144/121)  ABP(mean): 107 (2021 18:00) (88 - 132)  RR: 26 (2021 03:00) (10 - 27)  SpO2: 98% (2021 03:00) (94% - 100%)      Adult Advanced Hemodynamics Last 24 Hrs  CVP(mm Hg): --  CVP(cm H2O): --  CO: --  CI: --  PA: --  PA(mean): --  PCWP: --  SVR: --  SVRI: --  PVR: --  PVRI: --  CAPILLARY BLOOD GLUCOSE        CAPILLARY BLOOD GLUCOSE        I&O's Summary    2021 07:01  -  2021 07:00  --------------------------------------------------------  IN: 2536 mL / OUT: 1500 mL / NET: 1036 mL    2021 08:01  -  2021 03:48  --------------------------------------------------------  IN: 2300 mL / OUT: 3450 mL / NET: -1150 mL      Daily     Daily Weight in k (2021 04:00)    PHYSICAL EXAMINATION:  General: WN/WD NAD  HEENT: PERRLA, EOMI, moist mucous membranes  Neurology: A&Ox3, nonfocal, RIVAS x 4  Respiratory: CTA B/L, normal respiratory effort, no wheezes, crackles, rales  CV: RRR, S1S2, no murmurs, rubs or gallops  Abdominal: Soft, NT, ND +BS, Last BM  Extremities: No edema, + peripheral pulses  Incisions:   Tubes:    LABS:                          9.5    10.34 )-----------( 174      ( 2021 20:00 )             28.0     11-06    136  |  108  |  12  ----------------------------<  101<H>  3.9   |  17  |  0.7    Ca    7.3<L>      2021 05:05  Mg     1.6     11-06    TPro  3.8<L>  /  Alb  2.5<L>  /  TBili  0.7  /  DBili  x   /  AST  10  /  ALT  15  /  AlkPhos  43  11-06    LIVER FUNCTIONS - ( 2021 05:05 )  Alb: 2.5 g/dL / Pro: 3.8 g/dL / ALK PHOS: 43 U/L / ALT: 15 U/L / AST: 10 U/L / GGT: x           PT/INR - ( 2021 11:40 )   PT: 14.40 sec;   INR: 1.25 ratio         PTT - ( 2021 11:40 )  PTT:26.2 sec    CARDIAC MARKERS ( 2021 11:40 )  x     / <0.01 ng/mL / x     / x     / x              TELEMETRY:     EKG:     IMAGING:

## 2021-11-07 NOTE — PROGRESS NOTE ADULT - ASSESSMENT
62 yo M PMHx of diverticulitis s/p partial colon resection 2019 (left hemicolectomy) at [place], HTN, [place] x1d.     #)Lower GI bleed secondary to likely diverticulosis  -Hemodynamically unstable at the time of admission s/p 4 units PRBC, 1 unit FFP  -CTA positive for arterial extravasation in the distal descending colon/proximal sigmoid  -s/p IR angiogram 11/5 DALJIT and selective sigmoid angiography without evidence of active contrast extravasation  -Stabilised after 4 units PRBC and IVF  -s/p flex sigmoidoscopy after mag citrate and enema prep 11/5 with no evidence of active bleeding, showed severe diverticulosis  -Hemodynamically stable now  -Brown stool last night after finished 4 L golytely  - No BM this morning  - has LLQ abdominal pain  - started on IV abx on 11/6    Recs:   - c/w IV abx  - Clear liquid diet as tolerated  - No sings of gross GI bleed with Stable hgb and VS  - clear liquid diet  - monitor CBC  - patient wants to be transferred to [facility]--> critical team aware   - rest of management by critical care team

## 2021-11-07 NOTE — PROGRESS NOTE ADULT - ASSESSMENT
IMPRESSION:  Hypovolemic shock / Hemorrhagic shock   LGIB   HO Diverticulitis SP partial large bowel resection    HO HTN      PLAN:    CNS: Avoid Sedatives    HEENT: Oral care, HOB at 45    PULMONARY: Aspiration precautions    CARDIOVASCULAR: Keep MAP 60,  BP been stable     GI: GI prophylaxis, case discussed with GI H/H stable no active bleed now   but still has left lower quadrant tenderness   check LA   PPI Q 12 hrs   folow gi regarding scope joel and feed     RENAL: I=0, monitor electrolytes and replete as needed, stop Iv fluid if tolerate feed    replace mg     INFECTIOUS DISEASE: Monitor OFF ABX. procal, bl cx      HEMATOLOGICAL:  SCD, Keep Hb above 8, Active type and screen. CBC Q8H     ENDOCRINE:  Follow up FS.  Insulin protocol if needed.    MUSCULOSKELETAL: Bed rest     FULL CODE   MICU  patient want to be transferred to [place] has accepting physician   patient VS stable BP stable h/h stable can be transferred to step down or medical floor at [place]

## 2021-11-07 NOTE — PROGRESS NOTE ADULT - SUBJECTIVE AND OBJECTIVE BOX
Gastroenterology progress note:     Patient is a 61y old  Male who presents with a chief complaint of Rectal bleed (07 Nov 2021 09:24)       Admitted on: 11-05-21    We are following the patient for: LGIB     Interval History:  Brown stool after finished Golytely last night, complains of LLQ pain , afebrile    PAST MEDICAL & SURGICAL HISTORY:  Diverticulitis    HTN (hypertension)    HLD (hyperlipidemia)    H/O left knee surgery    S/P partial colectomy  2019        MEDICATIONS  (STANDING):  chlorhexidine 4% Liquid 1 Application(s) Topical <User Schedule>  ciprofloxacin   IVPB      ciprofloxacin   IVPB 400 milliGRAM(s) IV Intermittent every 12 hours  influenza   Vaccine 0.5 milliLiter(s) IntraMuscular once  melatonin 3 milliGRAM(s) Oral at bedtime  metroNIDAZOLE  IVPB 500 milliGRAM(s) IV Intermittent every 8 hours  metroNIDAZOLE  IVPB      ondansetron Injectable 4 milliGRAM(s) IV Push every 6 hours  pantoprazole  Injectable 40 milliGRAM(s) IV Push every 12 hours  sertraline 100 milliGRAM(s) Oral daily  simvastatin 20 milliGRAM(s) Oral at bedtime    MEDICATIONS  (PRN):  acetaminophen     Tablet .. 650 milliGRAM(s) Oral every 6 hours PRN Temp greater or equal to 38C (100.4F), Mild Pain (1 - 3)  oxycodone    5 mG/acetaminophen 325 mG 2 Tablet(s) Oral every 6 hours PRN Moderate Pain (4 - 6)      Allergies  No Known Allergies      Review of Systems:   Constitutional: Ne Fever, No chills, No weakness  ENT: No visual changes, No throat pain  Cardiovascular:  No Chest Pain, No Palpitations  Respiratory:  No Cough, No Dyspnea  Gastrointestinal:  As described in HPI  Neurological: No numbness or weakness  Skin: No rash, no itching    Physical Examination:  T(C): 37.1 (11-07-21 @ 12:00), Max: 37.2 (11-07-21 @ 08:00)  HR: 68 (11-07-21 @ 12:00) (68 - 85)  BP: 154/77 (11-07-21 @ 12:00) (130/74 - 171/81)  RR: 17 (11-07-21 @ 12:00) (10 - 27)  SpO2: 100% (11-07-21 @ 12:00) (93% - 100%)      11-06-21 @ 08:01  -  11-07-21 @ 07:00  --------------------------------------------------------  IN: 2900 mL / OUT: 4550 mL / NET: -1650 mL    11-07-21 @ 07:01  -  11-07-21 @ 13:27  --------------------------------------------------------  IN: 200 mL / OUT: 1400 mL / NET: -1200 mL      Constitutional: No acute distress.  Respiratory:  No signs of respiratory distress. Lung sounds are clear bilaterally.  Cardiovascular:  S1 S2, Regular rate and rhythm.  Abdominal: Abdomen is soft, symmetric, and LLQ tenderness without distention. There are no visible lesions. Bowel sounds are present and normoactive in all four quadrants. No masses, hepatomegaly, or splenomegaly are noted.   Skin: No rashes, No Jaundice.  Neurology: AAOX3, Non-focal  Vascular: No varicose vein, No cyanosis, No edema        Data: (reviewed by attending)                        9.6    8.77  )-----------( 178      ( 07 Nov 2021 11:00 )             28.6     Hgb trend:  9.6  11-07-21 @ 11:00  9.6  11-07-21 @ 05:00  9.5  11-06-21 @ 20:00  8.4  11-06-21 @ 14:48  9.2  11-06-21 @ 05:05  9.8  11-06-21 @ 00:20  10.1  11-05-21 @ 21:25  11.2  11-05-21 @ 11:40  12.4  11-05-21 @ 04:01  13.2  11-05-21 @ 01:00        11-07    139  |  105  |  6<L>  ----------------------------<  124<H>  3.5   |  21  |  0.8    Ca    8.1<L>      07 Nov 2021 05:00  Mg     2.0     11-07    TPro  5.3<L>  /  Alb  3.6  /  TBili  1.0  /  DBili  x   /  AST  15  /  ALT  20  /  AlkPhos  62  11-07    Liver panel trend:  TBili 1.0   /   AST 15   /   ALT 20   /   AlkP 62   /   Tptn 5.3   /   Alb 3.6    /   DBili --      11-07  TBili 0.7   /   AST 10   /   ALT 15   /   AlkP 43   /   Tptn 3.8   /   Alb 2.5    /   DBili --      11-06  TBili 1.1   /   AST 15   /   ALT 21   /   AlkP 55   /   Tptn 4.5   /   Alb 3.0    /   DBili --      11-05  TBili 0.5   /   AST 21   /   ALT 33   /   AlkP 77   /   Tptn 5.9   /   Alb 3.7    /   DBili --      11-05  TBili 0.5   /   AST 26   /   ALT 40   /   AlkP 86   /   Tptn 6.8   /   Alb 4.5    /   DBili <0.2      11-03      PT/INR - ( 07 Nov 2021 05:00 )   PT: 13.20 sec;   INR: 1.15 ratio         PTT - ( 07 Nov 2021 05:00 )  PTT:27.6 sec    Culture - Blood (collected 05 Nov 2021 12:00)  Source: .Blood None  Preliminary Report (06 Nov 2021 23:01):    No growth to date.

## 2021-11-08 LAB
ALBUMIN SERPL ELPH-MCNC: 3.5 G/DL — SIGNIFICANT CHANGE UP (ref 3.5–5.2)
ALP SERPL-CCNC: 63 U/L — SIGNIFICANT CHANGE UP (ref 30–115)
ALT FLD-CCNC: 19 U/L — SIGNIFICANT CHANGE UP (ref 0–41)
ANION GAP SERPL CALC-SCNC: 14 MMOL/L — SIGNIFICANT CHANGE UP (ref 7–14)
AST SERPL-CCNC: 20 U/L — SIGNIFICANT CHANGE UP (ref 0–41)
BASOPHILS # BLD AUTO: 0.05 K/UL — SIGNIFICANT CHANGE UP (ref 0–0.2)
BASOPHILS NFR BLD AUTO: 0.7 % — SIGNIFICANT CHANGE UP (ref 0–1)
BILIRUB SERPL-MCNC: 0.9 MG/DL — SIGNIFICANT CHANGE UP (ref 0.2–1.2)
BUN SERPL-MCNC: 6 MG/DL — LOW (ref 10–20)
CALCIUM SERPL-MCNC: 8.3 MG/DL — LOW (ref 8.5–10.1)
CHLORIDE SERPL-SCNC: 106 MMOL/L — SIGNIFICANT CHANGE UP (ref 98–110)
CO2 SERPL-SCNC: 20 MMOL/L — SIGNIFICANT CHANGE UP (ref 17–32)
CREAT SERPL-MCNC: 0.8 MG/DL — SIGNIFICANT CHANGE UP (ref 0.7–1.5)
EOSINOPHIL # BLD AUTO: 0.19 K/UL — SIGNIFICANT CHANGE UP (ref 0–0.7)
EOSINOPHIL NFR BLD AUTO: 2.7 % — SIGNIFICANT CHANGE UP (ref 0–8)
GLUCOSE SERPL-MCNC: 113 MG/DL — HIGH (ref 70–99)
HCT VFR BLD CALC: 26.1 % — LOW (ref 42–52)
HCT VFR BLD CALC: 27.2 % — LOW (ref 42–52)
HGB BLD-MCNC: 8.9 G/DL — LOW (ref 14–18)
HGB BLD-MCNC: 9.2 G/DL — LOW (ref 14–18)
IMM GRANULOCYTES NFR BLD AUTO: 0.7 % — HIGH (ref 0.1–0.3)
LYMPHOCYTES # BLD AUTO: 1.81 K/UL — SIGNIFICANT CHANGE UP (ref 1.2–3.4)
LYMPHOCYTES # BLD AUTO: 25.4 % — SIGNIFICANT CHANGE UP (ref 20.5–51.1)
MAGNESIUM SERPL-MCNC: 1.9 MG/DL — SIGNIFICANT CHANGE UP (ref 1.8–2.4)
MCHC RBC-ENTMCNC: 30.3 PG — SIGNIFICANT CHANGE UP (ref 27–31)
MCHC RBC-ENTMCNC: 30.6 PG — SIGNIFICANT CHANGE UP (ref 27–31)
MCHC RBC-ENTMCNC: 33.8 G/DL — SIGNIFICANT CHANGE UP (ref 32–37)
MCHC RBC-ENTMCNC: 34.1 G/DL — SIGNIFICANT CHANGE UP (ref 32–37)
MCV RBC AUTO: 89.5 FL — SIGNIFICANT CHANGE UP (ref 80–94)
MCV RBC AUTO: 89.7 FL — SIGNIFICANT CHANGE UP (ref 80–94)
MONOCYTES # BLD AUTO: 0.5 K/UL — SIGNIFICANT CHANGE UP (ref 0.1–0.6)
MONOCYTES NFR BLD AUTO: 7 % — SIGNIFICANT CHANGE UP (ref 1.7–9.3)
NEUTROPHILS # BLD AUTO: 4.53 K/UL — SIGNIFICANT CHANGE UP (ref 1.4–6.5)
NEUTROPHILS NFR BLD AUTO: 63.5 % — SIGNIFICANT CHANGE UP (ref 42.2–75.2)
NRBC # BLD: 0 /100 WBCS — SIGNIFICANT CHANGE UP (ref 0–0)
NRBC # BLD: 0 /100 WBCS — SIGNIFICANT CHANGE UP (ref 0–0)
PLATELET # BLD AUTO: 169 K/UL — SIGNIFICANT CHANGE UP (ref 130–400)
PLATELET # BLD AUTO: 184 K/UL — SIGNIFICANT CHANGE UP (ref 130–400)
POTASSIUM SERPL-MCNC: 3.8 MMOL/L — SIGNIFICANT CHANGE UP (ref 3.5–5)
POTASSIUM SERPL-SCNC: 3.8 MMOL/L — SIGNIFICANT CHANGE UP (ref 3.5–5)
PROT SERPL-MCNC: 5.5 G/DL — LOW (ref 6–8)
RBC # BLD: 2.91 M/UL — LOW (ref 4.7–6.1)
RBC # BLD: 3.04 M/UL — LOW (ref 4.7–6.1)
RBC # FLD: 13.2 % — SIGNIFICANT CHANGE UP (ref 11.5–14.5)
RBC # FLD: 13.4 % — SIGNIFICANT CHANGE UP (ref 11.5–14.5)
SODIUM SERPL-SCNC: 140 MMOL/L — SIGNIFICANT CHANGE UP (ref 135–146)
WBC # BLD: 7.13 K/UL — SIGNIFICANT CHANGE UP (ref 4.8–10.8)
WBC # BLD: 8.2 K/UL — SIGNIFICANT CHANGE UP (ref 4.8–10.8)
WBC # FLD AUTO: 7.13 K/UL — SIGNIFICANT CHANGE UP (ref 4.8–10.8)
WBC # FLD AUTO: 8.2 K/UL — SIGNIFICANT CHANGE UP (ref 4.8–10.8)

## 2021-11-08 PROCEDURE — 93306 TTE W/DOPPLER COMPLETE: CPT | Mod: 26

## 2021-11-08 PROCEDURE — 99232 SBSQ HOSP IP/OBS MODERATE 35: CPT

## 2021-11-08 PROCEDURE — 99233 SBSQ HOSP IP/OBS HIGH 50: CPT

## 2021-11-08 RX ORDER — SOD SULF/SODIUM/NAHCO3/KCL/PEG
4000 SOLUTION, RECONSTITUTED, ORAL ORAL ONCE
Refills: 0 | Status: DISCONTINUED | OUTPATIENT
Start: 2021-11-08 | End: 2021-11-08

## 2021-11-08 RX ADMIN — CHLORHEXIDINE GLUCONATE 1 APPLICATION(S): 213 SOLUTION TOPICAL at 10:59

## 2021-11-08 RX ADMIN — Medication 200 MILLIGRAM(S): at 18:38

## 2021-11-08 RX ADMIN — Medication 100 MILLIGRAM(S): at 22:57

## 2021-11-08 RX ADMIN — OXYCODONE AND ACETAMINOPHEN 2 TABLET(S): 5; 325 TABLET ORAL at 18:38

## 2021-11-08 RX ADMIN — Medication 3 MILLIGRAM(S): at 22:57

## 2021-11-08 RX ADMIN — Medication 200 MILLIGRAM(S): at 05:06

## 2021-11-08 RX ADMIN — PANTOPRAZOLE SODIUM 40 MILLIGRAM(S): 20 TABLET, DELAYED RELEASE ORAL at 05:05

## 2021-11-08 RX ADMIN — Medication 100 MILLIGRAM(S): at 05:06

## 2021-11-08 RX ADMIN — SIMVASTATIN 20 MILLIGRAM(S): 20 TABLET, FILM COATED ORAL at 23:02

## 2021-11-08 RX ADMIN — PANTOPRAZOLE SODIUM 40 MILLIGRAM(S): 20 TABLET, DELAYED RELEASE ORAL at 18:39

## 2021-11-08 RX ADMIN — SERTRALINE 100 MILLIGRAM(S): 25 TABLET, FILM COATED ORAL at 10:57

## 2021-11-08 NOTE — PROGRESS NOTE ADULT - ASSESSMENT
IMPRESSION:  Hypovolemic shock / Hemorrhagic shock   LGIB   HO Diverticulitis SP partial large bowel resection    HO HTN      PLAN:    CNS: Avoid Sedatives    HEENT: Oral care, HOB at 45    PULMONARY: Aspiration precautions    CARDIOVASCULAR: Keep MAP 60,  BP been stable     GI: GI prophylaxis, follow GI if considering scope if not advance diet to soft   follow G sx , GI   PPI Q 12 hrs       RENAL: I=0, monitor electrolytes and replete as needed,   replace mg     INFECTIOUS DISEASE: Monitor OFF ABX. procal, bl cx      HEMATOLOGICAL:  SCD, Keep Hb above 8, Active type and screen. CBC Q8H     ENDOCRINE:  Follow up FS.  Insulin protocol if needed.    MUSCULOSKELETAL: Bed rest     FULL CODE   transfer to floor h/h stable and BP stable

## 2021-11-08 NOTE — PROGRESS NOTE ADULT - SUBJECTIVE AND OBJECTIVE BOX
Patient is a 61y old  Male who presents with a chief complaint of Rectal bleed (07 Nov 2021 13:27)      Over Night Events:  Patient seen and examined. LL quadrant pain improved he tolerated clear liquid   pending transfer to floor or to [facility]:  See HPI    PHYSICAL EXAM    ICU Vital Signs Last 24 Hrs  T(C): 36.9 (08 Nov 2021 00:00), Max: 37.2 (07 Nov 2021 08:00)  T(F): 98.4 (08 Nov 2021 00:00), Max: 98.9 (07 Nov 2021 08:00)  HR: 71 (08 Nov 2021 06:00) (66 - 89)  BP: 123/72 (08 Nov 2021 06:00) (123/72 - 154/77)  BP(mean): 93 (08 Nov 2021 06:00) (84 - 110)  ABP: --  ABP(mean): --  RR: 18 (08 Nov 2021 06:00) (16 - 23)  SpO2: 93% (08 Nov 2021 06:00) (93% - 100%)      General: AOx3  HEENT:       darryl         Lymph Nodes: NO cervical LN   Lungs: Bilateral BS  Cardiovascular: Regular   Abdomen: Soft, Positive BS, mild tenderness LL   Extremities: No clubbing   Skin: warm   Neurological: no focal deficit   Musculoskeletal: move all ext     I&O's Detail    07 Nov 2021 07:01  -  08 Nov 2021 07:00  --------------------------------------------------------  IN:    IV PiggyBack: 700 mL    Lactated Ringers: 200 mL    Oral Fluid: 580 mL  Total IN: 1480 mL    OUT:    Voided (mL): 2700 mL  Total OUT: 2700 mL    Total NET: -1220 mL          LABS:                          9.2    7.13  )-----------( 184      ( 08 Nov 2021 04:30 )             27.2         08 Nov 2021 04:30    140    |  106    |  6      ----------------------------<  113    3.8     |  20     |  0.8      Ca    8.3        08 Nov 2021 04:30  Mg     1.9       08 Nov 2021 04:30    TPro  5.5    /  Alb  3.5    /  TBili  0.9    /  DBili  x      /  AST  20     /  ALT  19     /  AlkPhos  63     08 Nov 2021 04:30  Amylase x     lipase x                                                 PT/INR - ( 07 Nov 2021 05:00 )   PT: 13.20 sec;   INR: 1.15 ratio         PTT - ( 07 Nov 2021 05:00 )  PTT:27.6 sec                                           Lactate, Blood: 1.2 mmol/L (11-07-21 @ 11:00)                                                          Culture - Blood (collected 05 Nov 2021 12:00)  Source: .Blood None  Preliminary Report (06 Nov 2021 23:01):    No growth to date.                                                                                           MEDICATIONS  (STANDING):  chlorhexidine 4% Liquid 1 Application(s) Topical <User Schedule>  ciprofloxacin   IVPB      ciprofloxacin   IVPB 400 milliGRAM(s) IV Intermittent every 12 hours  influenza   Vaccine 0.5 milliLiter(s) IntraMuscular once  melatonin 3 milliGRAM(s) Oral at bedtime  metroNIDAZOLE  IVPB 500 milliGRAM(s) IV Intermittent every 8 hours  metroNIDAZOLE  IVPB      pantoprazole  Injectable 40 milliGRAM(s) IV Push every 12 hours  sertraline 100 milliGRAM(s) Oral daily  simvastatin 20 milliGRAM(s) Oral at bedtime    MEDICATIONS  (PRN):  acetaminophen     Tablet .. 650 milliGRAM(s) Oral every 6 hours PRN Temp greater or equal to 38C (100.4F), Mild Pain (1 - 3)  oxycodone    5 mG/acetaminophen 325 mG 2 Tablet(s) Oral every 6 hours PRN Moderate Pain (4 - 6)          Xrays:  TLC:  OG:  ET tube:                                                                                       ECHO:  CAM ICU:

## 2021-11-08 NOTE — PROGRESS NOTE ADULT - SUBJECTIVE AND OBJECTIVE BOX
KRISTINA MCKEON 61y Male  MRN#: [id number]   CODE STATUS: full    Hospital Day: 3d    SUBJECTIVE  Hospital Course  62 yo M    PMHx of diverticulitis s/p partial colon resection 2019 at [facility], HTN, DLD transferred from [facility] due to LGIB x1d.     Per wife at bedside, pt began to feel LLQ pain one week ago, contacted Dr Nava who prescribed a course of two antibiotics (does not recall names), pt completed abx course with no improvement in pain. Yesterday experienced BRBPR x4, went to [facility], CT A/P without abnormalities and stable Hb so was d/c home. BRBPR continued, [place], given prbc x2 and transferred to [place].     Hypotensive on admission, 60/40, code fusion called. Pt is s/p 2 additional units prbcs plus platelet x1. BP improved to MAP 67 and pt did not need pressor support. CTA A/P showing active intraluminal contrast extravasation in the distal descending colon/proximal sigmoid, compatible with active intestinal hemorrhage. IR c/s for embolization.     ED VS, T(F) Max: 98.5 HR: 63 BP: 89/53 MAP 66 RR: 18 SpO2: 99% in RA. Labs notable for Hb 12.4 <- 14.6. CXR unremarkable.     Admitted for hypovolemic shock due to ongoing intestinal hemorrhage.     Overnight events   - None , bleed stopped     Subjective complaints : mild L sided abdominal pain           -------------------------------------------------------------  RADIOLOGY:  < from: CT Angio Abdomen and Pelvis w/ IV Cont (11.05.21 @ 01:55) >  1. Active intraluminal contrast extravasation in the distal descending colon/proximal sigmoid, compatible with active intestinal hemorrhage.    2. Oral contrast has reached distal transverse colon.    3. Colonic diverticulosis.    Dr. Brian Ojeda discussed preliminary findings with SONYA RONDON MD on 11/5/2021 2:33 AM with readback.    --- End of Report ---    < end of copied text >     --------------------------------------------------------------    PHYSICAL EXAM:  GENERAL: NAD, lying in bed comfortably  ENT: Moist mucous membranes  NECK: Supple, No JVD  CHEST/LUNG: Clear to auscultation bilaterally; No rales, rhonchi, wheezing, or rubs. Unlabored respirations  HEART: Regular rate and rhythm; No murmurs, rubs, or gallops  ABDOMEN: Bowel sounds present; Soft, tender to palpation more on L side   EXTREMITIES:  2+ Peripheral Pulses, brisk capillary refill. No clubbing, cyanosis, or edema  NERVOUS SYSTEM:  Alert & Oriented X3, speech clear.  MSK: FROM all 4 extremities, full and equal strength                                              ----------------------------------------------------------  OBJECTIVE  PAST MEDICAL & SURGICAL HISTORY  Diverticulitis    HTN (hypertension)    HLD (hyperlipidemia)    H/O left knee surgery    S/P partial colectomy  2019                                              -----------------------------------------------------------  ALLERGIES:  No Known Allergies                                            ------------------------------------------------------------    HOME MEDICATIONS  Home Medications:  amLODIPine 10 mg oral tablet: 1 tab(s) orally once a day (05 Nov 2021 05:04)  Bystolic 5 mg oral tablet: 1 tab(s) orally once a day (16 Sep 2019 17:23)  furosemide 20 mg oral tablet: 1 tab(s) orally once a day (16 Sep 2019 17:23)  lisinopril 40 mg oral tablet: 1 tab(s) orally once a day (16 Sep 2019 17:23)  sertraline 100 mg oral tablet: 1 tab(s) orally once a day (16 Sep 2019 17:23)  simvastatin 20 mg oral tablet: 1 tab(s) orally once a day (at bedtime) (16 Sep 2019 17:23)                           MEDICATIONS:  STANDING MEDICATIONS  chlorhexidine 4% Liquid 1 Application(s) Topical <User Schedule>  ciprofloxacin   IVPB      ciprofloxacin   IVPB 400 milliGRAM(s) IV Intermittent every 12 hours  influenza   Vaccine 0.5 milliLiter(s) IntraMuscular once  melatonin 3 milliGRAM(s) Oral at bedtime  metroNIDAZOLE  IVPB 500 milliGRAM(s) IV Intermittent every 8 hours  metroNIDAZOLE  IVPB      pantoprazole  Injectable 40 milliGRAM(s) IV Push every 12 hours  sertraline 100 milliGRAM(s) Oral daily  simvastatin 20 milliGRAM(s) Oral at bedtime    PRN MEDICATIONS  acetaminophen     Tablet .. 650 milliGRAM(s) Oral every 6 hours PRN  oxycodone    5 mG/acetaminophen 325 mG 2 Tablet(s) Oral every 6 hours PRN                                            ------------------------------------------------------------  VITAL SIGNS: Last 24 Hours  T(C): 36.3 (08 Nov 2021 08:00), Max: 37.1 (07 Nov 2021 12:00)  T(F): 97.3 (08 Nov 2021 08:00), Max: 98.8 (07 Nov 2021 12:00)  HR: 66 (08 Nov 2021 10:00) (66 - 89)  BP: 143/76 (08 Nov 2021 10:00) (123/72 - 154/77)  BP(mean): 104 (08 Nov 2021 10:00) (84 - 110)  RR: 19 (08 Nov 2021 10:00) (17 - 21)  SpO2: 94% (08 Nov 2021 10:00) (93% - 100%)      11-07-21 @ 07:01  -  11-08-21 @ 07:00  --------------------------------------------------------  IN: 1480 mL / OUT: 2700 mL / NET: -1220 mL    11-08-21 @ 07:01  -  11-08-21 @ 11:42  --------------------------------------------------------  IN: 480 mL / OUT: 1000 mL / NET: -520 mL                                             --------------------------------------------------------------  LABS:                        9.2    7.13  )-----------( 184      ( 08 Nov 2021 04:30 )             27.2     11-08    140  |  106  |  6<L>  ----------------------------<  113<H>  3.8   |  20  |  0.8    Ca    8.3<L>      08 Nov 2021 04:30  Mg     1.9     11-08    TPro  5.5<L>  /  Alb  3.5  /  TBili  0.9  /  DBili  x   /  AST  20  /  ALT  19  /  AlkPhos  63  11-08    PT/INR - ( 07 Nov 2021 05:00 )   PT: 13.20 sec;   INR: 1.15 ratio         PTT - ( 07 Nov 2021 05:00 )  PTT:27.6 sec    Culture - Blood (collected 05 Nov 2021 12:00)  Source: .Blood None  Preliminary Report (06 Nov 2021 23:01):    No growth to date.

## 2021-11-08 NOTE — PROGRESS NOTE ADULT - ASSESSMENT
62 yo M PMHx of diverticulitis s/p partial colon resection 2019 (left hemicolectomy) at [place], HTN, DLD transferred from [facility] due to BRBPR x1d.     #)Lower GI bleed secondary to likely diverticulosis  -Hemodynamically unstable at the time of admission s/p 4 units PRBC, 1 unit FFP  -CTA positive for arterial extravasation in the distal descending colon/proximal sigmoid  -s/p IR angiogram 11/5 DALJIT and selective sigmoid angiography without evidence of active contrast extravasation  -Stabilised after 4 units PRBC and IVF  -s/p colonoscopy after mag citrate and enema prep 11/5 poor prep , with no evidence of active bleeding, showed severe diverticulosis   -Hemodynamically stable now      Recommendation :   - Clear liquid diet as tolerated  - No sings of gross GI bleed with Stable hgb and VS  - clear liquid diet , colonoscopy tomorrow if patient agreeable   - Golytely 4L this afternoon if patient agreeable   - monitor CBC  - patient wants to be transferred to [facility]--> critical team aware   - rest of management by critical care team   62 yo M PMHx of diverticulitis s/p partial colon resection 2019 (left hemicolectomy) at [place], HTN, DLD transferred from [facility] due to BRBPR x1d.     #)Lower GI bleed secondary to likely diverticulosis ( resolved)  -Hemodynamically unstable at the time of admission s/p 4 units PRBC, 1 unit FFP  -CTA positive for arterial extravasation in the distal descending colon/proximal sigmoid  -s/p IR angiogram 11/5 DALJIT and selective sigmoid angiography without evidence of active contrast extravasation  -s/p colonoscopy after mag citrate and enema prep 11/5 poor prep , with no evidence of active bleeding, showed severe diverticulosis   -Hemodynamically stable now  - No sings of gross GI bleed with Stable hgb and VS    Recommendation :   - clear liquid diet and advance as tolerated   -Patient was offered colonoscopy tomorrow  , by he refused the procedure ( wants to do it in [place]) understanding the risks and benefits    - monitor CBC , keep HGB above 8  - patient wants to be transferred to [facility]--> critical team aware   - rest of management by critical care team  -Please recall PRN

## 2021-11-08 NOTE — CHART NOTE - NSCHARTNOTEFT_GEN_A_CORE
Pt planned for possible colonoscopy tomorrow. Pt would like to defer colonoscopy at this time pending transfer to [place].

## 2021-11-08 NOTE — PROGRESS NOTE ADULT - ASSESSMENT
Pt is a 62yo M presented wtih :   Hypovolemic shock / Hemorrhagic shock   LGIB   HO Diverticulitis SP partial large bowel resection    HO HTN    #)Lower GI bleed secondary to likely diverticulosis  -Hemodynamically unstable at the time of admission s/p 4 units PRBC, 1 unit FFP  -CTA positive for arterial extravasation in the distal descending colon/proximal sigmoid  -s/p IR angiogram 11/5 DALJIT and selective sigmoid angiography without evidence of active contrast extravasation  -Stabilised after 4 units PRBC and IVF  -s/p flex sigmoidoscopy after mag citrate and enema prep 11/5 with no evidence of active bleeding, showed severe diverticulosis  -Hemodynamically stable now  -Brown stool last night after finished 4 L golytely  - No BM this morning  - has LLQ abdominal pain  - started on IV abx on 11/6  - No sings of gross GI bleed with Stable hgb and VS  - clear liquid diet  - monitor CBC  - patient wants to be transferred to [facility]--> critical team aware               PLAN:    CNS: Avoid Sedatives    HEENT: Oral care, HOB at 45    PULMONARY: Aspiration precautions    CARDIOVASCULAR: Keep MAP 60, Echo.  CE     GI: GI prophylaxis, follow GI recall now ?> still bleeding for colonoscopy seen BY IR no embolization     RENAL: I=0, monitor electrolytes and replete as needed,  IV hydration LR @100cc/hrs   replace mg     INFECTIOUS DISEASE: Monitor OFF ABX. procal, bl cx      HEMATOLOGICAL:  SCD, Keep Hb above 8, Active type and screen. CBC Q6H and Coags.  DIMEr   Seriel cbc  keep HB more then 8.5   ENDOCRINE:  Follow up FS.  Insulin protocol if needed.    MUSCULOSKELETAL: Bed rest     FULL CODE   MICU     Pt is a 60yo M presented wtih :   Hypovolemic shock / Hemorrhagic shock   LGIB   HO Diverticulitis SP partial large bowel resection    HO HTN    #)Lower GI bleed secondary to likely diverticulosis  -Hemodynamically unstable at the time of admission s/p 4 units PRBC, 1 unit FFP  -CTA positive for arterial extravasation in the distal descending colon/proximal sigmoid  -s/p IR angiogram 11/5 DALJIT and selective sigmoid angiography without evidence of active contrast extravasation  -Stabilised after 4 units PRBC and IVF  -s/p flex sigmoidoscopy after mag citrate and enema prep 11/5 with no evidence of active bleeding, showed severe diverticulosis  -Hemodynamically stable now  -Brown stool last night after finished 4 L golytely  - No BM this morning  - has LLQ abdominal pain  - started on IV abx on 11/6  - No sings of gross GI bleed with Stable hgb and VS  - clear liquid diet  - monitor CBC  - patient wants to be transferred to [facility]--> critical team aware         SYSTEM BASED PLAN:    CNS: Avoid Sedatives    HEENT: Oral care, HOB at 45    PULMONARY: Aspiration precautions    CARDIOVASCULAR: Keep MAP 60, Echo.  CE     GI: GI prophylaxis, follow GI recall now ?> still bleeding for colonoscopy seen BY IR no embolization     RENAL: I=0, monitor electrolytes and replete as needed,  IV hydration LR @100cc/hrs   replace mg     INFECTIOUS DISEASE: Monitor OFF ABX. procal, bl cx      HEMATOLOGICAL:  SCD, Keep Hb above 8, Active type and screen. CBC Q6H and Coags.  DIMEr   Seriel cbc  keep HB more then 8.5   ENDOCRINE:  Follow up FS.  Insulin protocol if needed.    MUSCULOSKELETAL: Bed rest     FULL CODE   MICU

## 2021-11-08 NOTE — PROGRESS NOTE ADULT - SUBJECTIVE AND OBJECTIVE BOX
Gastroenterology progress note:     Patient is a 61y old  Male who presents with a chief complaint of Rectal bleed (08 Nov 2021 07:57)       Admitted on: 11-05-21    We are following the patient for: Lower GI bleed      Interval History: no further GI bleed reported overnight , no abdominal pain , HGB stable , HD stable     Patient's medical problems are improving   Prior records reviewed (Y/N): Y  History obtained from someone other than patient (Y/N): Y      PAST MEDICAL & SURGICAL HISTORY:  Diverticulitis    HTN (hypertension)    HLD (hyperlipidemia)    H/O left knee surgery    S/P partial colectomy  2019        MEDICATIONS  (STANDING):  chlorhexidine 4% Liquid 1 Application(s) Topical <User Schedule>  ciprofloxacin   IVPB      ciprofloxacin   IVPB 400 milliGRAM(s) IV Intermittent every 12 hours  influenza   Vaccine 0.5 milliLiter(s) IntraMuscular once  melatonin 3 milliGRAM(s) Oral at bedtime  metroNIDAZOLE  IVPB 500 milliGRAM(s) IV Intermittent every 8 hours  metroNIDAZOLE  IVPB      pantoprazole  Injectable 40 milliGRAM(s) IV Push every 12 hours  sertraline 100 milliGRAM(s) Oral daily  simvastatin 20 milliGRAM(s) Oral at bedtime    MEDICATIONS  (PRN):  acetaminophen     Tablet .. 650 milliGRAM(s) Oral every 6 hours PRN Temp greater or equal to 38C (100.4F), Mild Pain (1 - 3)  oxycodone    5 mG/acetaminophen 325 mG 2 Tablet(s) Oral every 6 hours PRN Moderate Pain (4 - 6)      Allergies  No Known Allergies      Review of Systems:   Cardiovascular:  No Chest Pain, No Palpitations  Respiratory:  No Cough, No Dyspnea  Gastrointestinal:  As described in HPI    Physical Examination:  T(C): 36.3 (11-08-21 @ 08:00), Max: 37.1 (11-07-21 @ 12:00)  HR: 72 (11-08-21 @ 08:00) (66 - 89)  BP: 135/82 (11-08-21 @ 08:00) (123/72 - 154/77)  RR: 18 (11-08-21 @ 06:00) (17 - 21)  SpO2: 95% (11-08-21 @ 08:00) (93% - 100%)      11-07-21 @ 07:01  -  11-08-21 @ 07:00  --------------------------------------------------------  IN: 1480 mL / OUT: 2700 mL / NET: -1220 mL    11-08-21 @ 07:01  -  11-08-21 @ 10:27  --------------------------------------------------------  IN: 240 mL / OUT: 500 mL / NET: -260 mL      Constitutional: No acute distress.  Respiratory:  No signs of respiratory distress. Lung sounds are clear bilaterally.  Cardiovascular:  S1 S2, Regular rate and rhythm.  Abdominal: Abdomen is soft, symmetric, and non-tender without distention.  Bowel sounds are present and normoactive in all four quadrants. No masses, hepatomegaly, or splenomegaly are noted.   Skin: No rashes, No Jaundice.        Data: (reviewed by attending)                        9.2    7.13  )-----------( 184      ( 08 Nov 2021 04:30 )             27.2     Hgb trend:  9.2  11-08-21 @ 04:30  8.9  11-08-21 @ 01:20  9.8  11-07-21 @ 17:13  9.6  11-07-21 @ 11:00  9.6  11-07-21 @ 05:00  9.5  11-06-21 @ 20:00  8.4  11-06-21 @ 14:48  9.2  11-06-21 @ 05:05  9.8  11-06-21 @ 00:20  10.1  11-05-21 @ 21:25  11.2  11-05-21 @ 11:40        11-08    140  |  106  |  6<L>  ----------------------------<  113<H>  3.8   |  20  |  0.8    Ca    8.3<L>      08 Nov 2021 04:30  Mg     1.9     11-08    TPro  5.5<L>  /  Alb  3.5  /  TBili  0.9  /  DBili  x   /  AST  20  /  ALT  19  /  AlkPhos  63  11-08    Liver panel trend:  TBili 0.9   /   AST 20   /   ALT 19   /   AlkP 63   /   Tptn 5.5   /   Alb 3.5    /   DBili --      11-08  TBili 1.0   /   AST 15   /   ALT 20   /   AlkP 62   /   Tptn 5.3   /   Alb 3.6    /   DBili --      11-07  TBili 0.7   /   AST 10   /   ALT 15   /   AlkP 43   /   Tptn 3.8   /   Alb 2.5    /   DBili --      11-06  TBili 1.1   /   AST 15   /   ALT 21   /   AlkP 55   /   Tptn 4.5   /   Alb 3.0    /   DBili --      11-05  TBili 0.5   /   AST 21   /   ALT 33   /   AlkP 77   /   Tptn 5.9   /   Alb 3.7    /   DBili --      11-05  TBili 0.5   /   AST 26   /   ALT 40   /   AlkP 86   /   Tptn 6.8   /   Alb 4.5    /   DBili <0.2      11-03      PT/INR - ( 07 Nov 2021 05:00 )   PT: 13.20 sec;   INR: 1.15 ratio         PTT - ( 07 Nov 2021 05:00 )  PTT:27.6 sec    Culture - Blood (collected 05 Nov 2021 12:00)  Source: .Blood None  Preliminary Report (06 Nov 2021 23:01):    No growth to date.         Radiology: (reviewed by attending)

## 2021-11-09 ENCOUNTER — TRANSCRIPTION ENCOUNTER (OUTPATIENT)
Age: 61
End: 2021-11-09

## 2021-11-09 LAB
ALBUMIN SERPL ELPH-MCNC: 3.6 G/DL — SIGNIFICANT CHANGE UP (ref 3.5–5.2)
ALP SERPL-CCNC: 65 U/L — SIGNIFICANT CHANGE UP (ref 30–115)
ALT FLD-CCNC: 17 U/L — SIGNIFICANT CHANGE UP (ref 0–41)
ANION GAP SERPL CALC-SCNC: 14 MMOL/L — SIGNIFICANT CHANGE UP (ref 7–14)
AST SERPL-CCNC: 15 U/L — SIGNIFICANT CHANGE UP (ref 0–41)
BILIRUB SERPL-MCNC: 0.9 MG/DL — SIGNIFICANT CHANGE UP (ref 0.2–1.2)
BUN SERPL-MCNC: 6 MG/DL — LOW (ref 10–20)
CALCIUM SERPL-MCNC: 8.6 MG/DL — SIGNIFICANT CHANGE UP (ref 8.5–10.1)
CHLORIDE SERPL-SCNC: 106 MMOL/L — SIGNIFICANT CHANGE UP (ref 98–110)
CO2 SERPL-SCNC: 21 MMOL/L — SIGNIFICANT CHANGE UP (ref 17–32)
CREAT SERPL-MCNC: 0.9 MG/DL — SIGNIFICANT CHANGE UP (ref 0.7–1.5)
GLUCOSE SERPL-MCNC: 108 MG/DL — HIGH (ref 70–99)
HCT VFR BLD CALC: 29 % — LOW (ref 42–52)
HGB BLD-MCNC: 9.7 G/DL — LOW (ref 14–18)
MCHC RBC-ENTMCNC: 30.2 PG — SIGNIFICANT CHANGE UP (ref 27–31)
MCHC RBC-ENTMCNC: 33.4 G/DL — SIGNIFICANT CHANGE UP (ref 32–37)
MCV RBC AUTO: 90.3 FL — SIGNIFICANT CHANGE UP (ref 80–94)
NRBC # BLD: 0 /100 WBCS — SIGNIFICANT CHANGE UP (ref 0–0)
PLATELET # BLD AUTO: 196 K/UL — SIGNIFICANT CHANGE UP (ref 130–400)
POTASSIUM SERPL-MCNC: 3.5 MMOL/L — SIGNIFICANT CHANGE UP (ref 3.5–5)
POTASSIUM SERPL-SCNC: 3.5 MMOL/L — SIGNIFICANT CHANGE UP (ref 3.5–5)
PROT SERPL-MCNC: 5.5 G/DL — LOW (ref 6–8)
RBC # BLD: 3.21 M/UL — LOW (ref 4.7–6.1)
RBC # FLD: 13.2 % — SIGNIFICANT CHANGE UP (ref 11.5–14.5)
SARS-COV-2 RNA SPEC QL NAA+PROBE: SIGNIFICANT CHANGE UP
SODIUM SERPL-SCNC: 141 MMOL/L — SIGNIFICANT CHANGE UP (ref 135–146)
WBC # BLD: 8.24 K/UL — SIGNIFICANT CHANGE UP (ref 4.8–10.8)
WBC # FLD AUTO: 8.24 K/UL — SIGNIFICANT CHANGE UP (ref 4.8–10.8)

## 2021-11-09 PROCEDURE — 99233 SBSQ HOSP IP/OBS HIGH 50: CPT

## 2021-11-09 RX ORDER — PIPERACILLIN AND TAZOBACTAM 4; .5 G/20ML; G/20ML
3.38 INJECTION, POWDER, LYOPHILIZED, FOR SOLUTION INTRAVENOUS EVERY 6 HOURS
Refills: 0 | Status: DISCONTINUED | OUTPATIENT
Start: 2021-11-09 | End: 2021-11-10

## 2021-11-09 RX ADMIN — PANTOPRAZOLE SODIUM 40 MILLIGRAM(S): 20 TABLET, DELAYED RELEASE ORAL at 17:36

## 2021-11-09 RX ADMIN — PIPERACILLIN AND TAZOBACTAM 25 GRAM(S): 4; .5 INJECTION, POWDER, LYOPHILIZED, FOR SOLUTION INTRAVENOUS at 23:54

## 2021-11-09 RX ADMIN — SERTRALINE 100 MILLIGRAM(S): 25 TABLET, FILM COATED ORAL at 11:08

## 2021-11-09 RX ADMIN — CHLORHEXIDINE GLUCONATE 1 APPLICATION(S): 213 SOLUTION TOPICAL at 04:25

## 2021-11-09 RX ADMIN — SIMVASTATIN 20 MILLIGRAM(S): 20 TABLET, FILM COATED ORAL at 21:16

## 2021-11-09 RX ADMIN — Medication 3 MILLIGRAM(S): at 21:16

## 2021-11-09 RX ADMIN — OXYCODONE AND ACETAMINOPHEN 2 TABLET(S): 5; 325 TABLET ORAL at 11:08

## 2021-11-09 RX ADMIN — PIPERACILLIN AND TAZOBACTAM 25 GRAM(S): 4; .5 INJECTION, POWDER, LYOPHILIZED, FOR SOLUTION INTRAVENOUS at 17:35

## 2021-11-09 RX ADMIN — OXYCODONE AND ACETAMINOPHEN 2 TABLET(S): 5; 325 TABLET ORAL at 21:51

## 2021-11-09 RX ADMIN — Medication 200 MILLIGRAM(S): at 06:18

## 2021-11-09 NOTE — DISCHARGE NOTE PROVIDER - NSDCFUADDAPPT_GEN_ALL_CORE_FT
your blood pressure medications have been held, in the setting of lower blood pressure while you had GI bleeding,     talk to your PCP about restarting them if needed.

## 2021-11-09 NOTE — DISCHARGE NOTE PROVIDER - NSDCCAREPROVSEEN_GEN_ALL_CORE_FT
[facility] medicine team, [facility] gastroenterology, [facility] interventional radiology

## 2021-11-09 NOTE — DISCHARGE NOTE PROVIDER - HOSPITAL COURSE
62 yo M PMHx of diverticulitis s/p partial colon resection 2019 at [place], HTN, DLD transferred from [facility] due to BRBPR x1d.   Per wife at bedside, states pt began to feel LLQ pain one week ago, contacted Dr Nava who prescribed a course of two antibiotics (does not recall names), pt completed abx course with no improvement in pain. Yesterday experienced BRBPR x4, went to [facility], CT A/P without abnormalities and stable Hb so was d/c home. BRBPR continued, [place] given prbc x2 and transferred to [place]. Hypotensive on admission, 60/40, code fusion called for initiation of MTP. Pt is s/p 2 additional units prbcs, platelet x1 and calcium gluconate x1, 2L LR bolus. BP improved to MAP 67 and pt did not need pressor support.   CTA A/P showing active intraluminal contrast extravasation in the distal descending colon/proximal sigmoid, compatible with active intestinal hemorrhage. IR c/s for embolization.   ED VS, T(F) Max: 98.5 HR: 63 BP: 89/53 MAP 66 RR: 18 SpO2: 99% in RA. Labs notable for Hb 12.4 <- 14.6. CXR unremarkable.     Admitted for hypovolemic shock due to ongoing intestinal hemorrhage.     11/5 IR angiography - DALJIT and selective sigmoid angiography withoutevidence of active contrast extravasation. The sigmoid arcade vessels were very small caliber due to vasoconstriction. The patient remained hemodynamically stable throughout the procedure. Findings discussed with gastroenterology.    11/5 -Colonoscopy findings: Severe diverticulosis throughout the colon. Blood clots and stools limited the visualization but no evidence of active bleeding was noted.     pt refusing repeat colonscopy at [facility] would like to go to be transferred to [place] for further management    #acute blood loss anemia   #Hypovolemic shock - resolved   #LGIB  #DIverticulosis   #HO diverticultis s/p colectomy   - s/p 4 units prbc 1 unit ffp   - s/p IR angio- no intervention   - s/p colonscopy no intervention   - now HD stable, with hgb stable   - monitor CBC q12hr   - keep hgb >8   - maintain active type and screen   - clear liquid diet   - pt refusing colonscopy at [facility]   - pantoprazole 40mg IV BID   - abx will switch to zosyn 3.375mg q6hr   -     #HTN  home regimen: amlodipine 5mg qd/ lisinopril 40mg qd/lasix 20mg qd   - monitor off meds     #HLD   - continue simvastatin 20mg qd     #depression  - continue sertraline 100mg qd      62 yo M PMHx of diverticulitis s/p partial colon resection 2019 at [place], HTN, DLD transferred from [facility] due to BRBPR x1d.   Per wife at bedside, states pt began to feel LLQ pain one week ago, contacted Dr Nava who prescribed a course of two antibiotics (does not recall names), pt completed abx course with no improvement in pain. Yesterday experienced BRBPR x4, went to [facility], CT A/P without abnormalities and stable Hb so was d/c home. BRBPR continued, pt was brought back [place], given prbc x2 and transferred to [place]. Hypotensive on admission, 60/40, code fusion called for initiation of MTP. Pt is s/p 2 additional units prbcs, platelet x1 and calcium gluconate x1, 2L LR bolus. BP improved to MAP 67 and pt did not need pressor support.   CTA A/P showing active intraluminal contrast extravasation in the distal descending colon/proximal sigmoid, compatible with active intestinal hemorrhage. IR c/s for embolization.   ED VS, T(F) Max: 98.5 HR: 63 BP: 89/53 MAP 66 RR: 18 SpO2: 99% in RA. Labs notable for Hb 12.4 <- 14.6. CXR unremarkable.     Admitted for hypovolemic shock due to ongoing intestinal hemorrhage.     11/5 IR angiography - DALJIT and selective sigmoid angiography withoutevidence of active contrast extravasation. The sigmoid arcade vessels were very small caliber due to vasoconstriction. The patient remained hemodynamically stable throughout the procedure. Findings discussed with gastroenterology.    11/5 -Colonoscopy findings: Severe diverticulosis throughout the colon. Blood clots and stools limited the visualization but no evidence of active bleeding was noted.     pt refusing repeat colonscopy at [facility] would like to go to be transferred to [place] for further management    #acute blood loss anemia   #Hypovolemic shock - resolved   #LGIB  #DIverticulosis   #HO diverticultis s/p colectomy   - s/p 4 units prbc 1 unit ffp   - s/p IR angio- no intervention   - s/p colonscopy no intervention   - now HD stable, with hgb stable   - monitor CBC q12hr   - keep hgb >8   - maintain active type and screen   - clear liquid diet   - pt refusing colonscopy at [facility]   - pantoprazole 40mg IV BID   - abx augmentin tid     #HTN  home regimen: amlodipine 5mg qd/ lisinopril 40mg qd/lasix 20mg qd   - monitor off meds     #HLD   - continue simvastatin 20mg qd     #depression  - continue sertraline 100mg qd     pt refusing colonscopy at [facility], wants to go to [place], pt to fu with doctor at [place] today   and with pcp  sent home with abx to complete a total of ten days, and with protonix once daily 60 yo M PMHx of diverticulitis s/p partial colon resection 2019 at [place], HTN, DLD transferred from [facility] due to BRBPR x1d.   Per wife at bedside, states pt began to feel LLQ pain one week ago, contacted Dr Nava who prescribed a course of two antibiotics (does not recall names), pt completed abx course with no improvement in pain. Yesterday experienced BRBPR x4, went to [facility], CT A/P without abnormalities and stable Hb so was d/c home. BRBPR continued, pt was brought back [place], given prbc x2 and transferred to [place]. Hypotensive on admission, 60/40, code fusion called for initiation of MTP. Pt is s/p 2 additional units prbcs, platelet x1 and calcium gluconate x1, 2L LR bolus. BP improved to MAP 67 and pt did not need pressor support.   CTA A/P showing active intraluminal contrast extravasation in the distal descending colon/proximal sigmoid, compatible with active intestinal hemorrhage. IR c/s for embolization.   ED VS, T(F) Max: 98.5 HR: 63 BP: 89/53 MAP 66 RR: 18 SpO2: 99% in RA. Labs notable for Hb 12.4 <- 14.6. CXR unremarkable.     Admitted for hypovolemic shock due to ongoing intestinal hemorrhage.     11/5 IR angiography - DALJIT and selective sigmoid angiography withoutevidence of active contrast extravasation. The sigmoid arcade vessels were very small caliber due to vasoconstriction. The patient remained hemodynamically stable throughout the procedure. Findings discussed with gastroenterology.    11/5 -Colonoscopy findings: Severe diverticulosis throughout the colon. Blood clots and stools limited the visualization but no evidence of active bleeding was noted.     pt refusing repeat colonscopy at [facility] would like to go to be transferred to [place] for further management    #acute blood loss anemia   #Hypovolemic shock - resolved   #LGIB  #DIverticulosis   #HO diverticultis s/p colectomy   - s/p 4 units prbc 1 unit ffp   - s/p IR angio- no intervention   - s/p colonscopy no intervention   - now HD stable, with hgb stable   - monitor CBC q12hr   - keep hgb >8   - maintain active type and screen   - clear liquid diet   - pt refusing colonscopy at [facility]   - pantoprazole 40mg IV BID   - abx augmentin tid     #HTN  home regimen: amlodipine 5mg qd/ lisinopril 40mg qd/lasix 20mg qd   - monitor off meds     #HLD   - continue simvastatin 20mg qd     #depression  - continue sertraline 100mg qd     pt refusing colonscopy at [facility], wants to go to [place], pt to fu with doctor at [place] today   and with pcp  sent home with abx to complete a total of ten days, and with protonix once daily. Currently HDS, afebrile. Improvement of abdominal pain. H/H stable-improved serially. Patient still without BM but passing gas. Received senna and miralax but insistent on leaving today to make it to his [place] appointment.

## 2021-11-09 NOTE — DISCHARGE NOTE PROVIDER - NSDCMRMEDTOKEN_GEN_ALL_CORE_FT
amLODIPine 10 mg oral tablet: 1 tab(s) orally once a day  Bystolic 5 mg oral tablet: 1 tab(s) orally once a day  furosemide 20 mg oral tablet: 1 tab(s) orally once a day  lisinopril 40 mg oral tablet: 1 tab(s) orally once a day  sertraline 100 mg oral tablet: 1 tab(s) orally once a day  simvastatin 20 mg oral tablet: 1 tab(s) orally once a day (at bedtime)   amoxicillin-clavulanate 875 mg-125 mg oral tablet: 1 tab(s) orally every 8 hours until 11/15  Protonix 40 mg oral delayed release tablet: 1 tab(s) orally once a day (at bedtime)   sertraline 100 mg oral tablet: 1 tab(s) orally once a day  simvastatin 20 mg oral tablet: 1 tab(s) orally once a day (at bedtime)

## 2021-11-09 NOTE — DISCHARGE NOTE PROVIDER - CARE PROVIDER_API CALL
Fatoumata Cruz  Internal Medicine  [address] [place] [address]  Phone: [phone]  Fax: [phone]  Follow Up Time: 1 week    Rc Flores ()  Radiology  [address] [place] [id number]  Phone: [phone]  Fax: [phone]  Follow Up Time: 2 weeks    Kendall Garrett)  Gastroenterology; Internal Medicine  [address] [place] [id number]  Phone: [phone]  Fax: [phone]  Follow Up Time: 2 weeks

## 2021-11-09 NOTE — PROVIDER CONTACT NOTE (OTHER) - SITUATION
pt received percocet 2 tabs at 1108. pt is no c/o pain 8/10 unresolved, nausea and dizziness. /69 hr 70 resp 18 pulse ox 97%

## 2021-11-09 NOTE — PROGRESS NOTE ADULT - SUBJECTIVE AND OBJECTIVE BOX
KRISTINA MCKEON 61y Male  MRN#: [id number]   CODE STATUS: full code     Hospital Day: 4d    Pt is currently admitted with the primary diagnosis of LGIB     SUBJECTIVE    No acute events overnight, denies fever chills, nvd, no bowel movement since yesterday                                             ----------------------------------------------------------  OBJECTIVE  PAST MEDICAL & SURGICAL HISTORY  Diverticulitis    HTN (hypertension)    HLD (hyperlipidemia)    H/O left knee surgery    S/P partial colectomy  2019                                              -----------------------------------------------------------  ALLERGIES:  No Known Allergies                                            ------------------------------------------------------------    HOME MEDICATIONS  Home Medications:  amLODIPine 10 mg oral tablet: 1 tab(s) orally once a day (05 Nov 2021 05:04)  Bystolic 5 mg oral tablet: 1 tab(s) orally once a day (16 Sep 2019 17:23)  furosemide 20 mg oral tablet: 1 tab(s) orally once a day (16 Sep 2019 17:23)  lisinopril 40 mg oral tablet: 1 tab(s) orally once a day (16 Sep 2019 17:23)  sertraline 100 mg oral tablet: 1 tab(s) orally once a day (16 Sep 2019 17:23)  simvastatin 20 mg oral tablet: 1 tab(s) orally once a day (at bedtime) (16 Sep 2019 17:23)                           MEDICATIONS:  STANDING MEDICATIONS  chlorhexidine 4% Liquid 1 Application(s) Topical <User Schedule>  ciprofloxacin   IVPB 400 milliGRAM(s) IV Intermittent every 12 hours  ciprofloxacin   IVPB      influenza   Vaccine 0.5 milliLiter(s) IntraMuscular once  melatonin 3 milliGRAM(s) Oral at bedtime  metroNIDAZOLE  IVPB 500 milliGRAM(s) IV Intermittent every 8 hours  metroNIDAZOLE  IVPB      pantoprazole  Injectable 40 milliGRAM(s) IV Push every 12 hours  sertraline 100 milliGRAM(s) Oral daily  simvastatin 20 milliGRAM(s) Oral at bedtime    PRN MEDICATIONS  acetaminophen     Tablet .. 650 milliGRAM(s) Oral every 6 hours PRN  oxycodone    5 mG/acetaminophen 325 mG 2 Tablet(s) Oral every 6 hours PRN                                            ------------------------------------------------------------  VITAL SIGNS: Last 24 Hours  T(C): 36.4 (09 Nov 2021 11:30), Max: 36.4 (09 Nov 2021 11:30)  T(F): 97.6 (09 Nov 2021 11:30), Max: 97.6 (09 Nov 2021 11:30)  HR: 70 (09 Nov 2021 11:30) (67 - 70)  BP: 123/69 (09 Nov 2021 11:30) (123/69 - 163/96)  BP(mean): --  RR: 18 (09 Nov 2021 11:30) (18 - 19)  SpO2: 97% (09 Nov 2021 11:30) (97% - 98%)      11-08-21 @ 07:01  -  11-09-21 @ 07:00  --------------------------------------------------------  IN: 720 mL / OUT: 1000 mL / NET: -280 mL                                             --------------------------------------------------------------  LABS:                        9.7    8.24  )-----------( 196      ( 09 Nov 2021 06:27 )             29.0     11-09    141  |  106  |  6<L>  ----------------------------<  108<H>  3.5   |  21  |  0.9    Ca    8.6      09 Nov 2021 06:27  Mg     1.9     11-08    TPro  5.5<L>  /  Alb  3.6  /  TBili  0.9  /  DBili  x   /  AST  15  /  ALT  17  /  AlkPhos  65  11-09                                                              -------------------------------------------------------------  RADIOLOGY:      EXAM:  CT ANGIO ABD PELV (W)AW IC            PROCEDURE DATE:  11/05/2021            INTERPRETATION:  CLINICAL HISTORY/REASON FOR EXAM: Rectal bleeding. History of diverticulitis status post partial colon resection.    TECHNIQUE: Contiguous axial CTimages were obtained from the chest to the pubic symphysis following administration of 95 cc Optiray 320 intravenous contrast.  0cc of contrast were discarded and without IV contrast.  Oral contrast was administered.  Reformatted images in the coronal and sagittal planes were acquired.    COMPARISON: CT abdomen pelvis with oral contrast 11/4/2021.    FINDINGS:    CT ANGIOGRAPHY:    The abdominal aorta is normal in caliber.  The celiac artery, SMA, bilateral renal arteries and DALJIT are patent. No evidence of aneurysm or dissection.    There is active intraluminal contrast extravasation in the distal descending colon/proximal sigmoid (series 5/125).    HEPATOBILIARY: Unchanged.    SPLEEN: Unchanged.    PANCREAS: Unchanged.    ADRENAL GLANDS: Unchanged.    KIDNEYS: Unchanged.    ABDOMINOPELVIC NODES: Unchanged.    PELVIC ORGANS: Unchanged.    PERITONEUM/MESENTERY/BOWEL: Oral contrast is seen within the ascending and transverse colon, limiting evaluation. No bowel obstruction, pneumoperitoneumor ascites. Colonic diverticulosis. Normal appendix. There is active intraluminal contrast extravasation in the distal descending colon/proximal sigmoid (series 5/125).    BONES/SOFT TISSUES: Unchanged.      IMPRESSION:  1. Active intraluminal contrast extravasation in the distal descending colon/proximal sigmoid, compatible with active intestinal hemorrhage.    2. Oral contrast has reached distal transverse colon.    3. Colonic diverticulosis.    Dr. Brian Ojeda discussed preliminary findings with SONYA RONDON MD on 11/5/2021 2:33 AM with readback.    --- End of Report ---      EXAM:  CT ABDOMEN AND PELVIS OC IC            PROCEDURE DATE:  11/04/2021            INTERPRETATION:  CLINICAL STATEMENT: Abdominal pain. Rectal bleeding. Diverticulitis history.    TECHNIQUE: Contiguous axial CT images were obtained from the lowerchest to the pubic symphysis following administration of 100cc Optiray 320 intravenous contrast.  Oral contrast was administered.  Reformatted images in the coronal and sagittal planes were acquired.    Comparison made with CT abdomen and pelvis September 16, 2019.    FINDINGS:    LOWER CHEST: Unremarkable.    HEPATOBILIARY: Cholelithiasis. Right hepatic lobe cyst. No biliary dilation.    SPLEEN: Unremarkable.    PANCREAS: Unremarkable.    ADRENAL GLANDS: Unremarkable.    KIDNEYS: Unremarkable.    ABDOMINOPELVIC NODES: Unremarkable.    PELVIC ORGANS: Unremarkable.    PERITONEUM/MESENTERY/BOWEL: Post partial sigmoid resection. Colonic diverticulosis, without CT evidence of active diverticulitis. No bowel obstruction. Normal appendix. Ventral hernia containing nonobstructed bowel.    BONES/SOFT TISSUES: Degenerative change, lumbar spine.    OTHER: Vascular calcifications. Small fat-containing umbilical hernia.      IMPRESSION:  No evidence of acute intra-abdominal pathology.    --- End of Report ---              MEGHAN QUEEN MD; Attending Radiologist  This document has been electronically signed. Nov 4 2021  1:17AM                                                --------------------------------------------------------------    PHYSICAL EXAM:  GENERAL:  in NAD   HEENT: NCAT  LUNGS: CTAB, Good air entry bilaterally   HEART: RRR, +S1,S2, RRR  ABDOMEN: SNTTP, ND x 4 q's  EXT: Warm, well perfused x 4  NEURO: AxOx3, No FND's noted  SKIN: No new breakdown or rashes noted                                          --------------------------------------------------------------

## 2021-11-09 NOTE — PROGRESS NOTE ADULT - ASSESSMENT
Assessment   62 yo M PMHx of diverticulitis s/p partial colon resection 2019 at [facility], HTN, DLD transferred from [facility] due to BRBPR x1d.   Per wife at bedside, states pt began to feel LLQ pain one week ago, contacted Dr Nava who prescribed a course of two antibiotics (does not recall names), pt completed abx course with no improvement in pain. Yesterday experienced BRBPR x4, went to [facility], CT A/P without abnormalities and stable Hb so was d/c home. BRBPR continued, [place], given prbc x2 and transferred to [place]. Hypotensive on admission, 60/40, code fusion called for initiation of MTP. Pt is s/p 2 additional units prbcs, platelet x1 and calcium gluconate x1, 2L LR bolus. BP improved to MAP 67 and pt did not need pressor support.   CTA A/P showing active intraluminal contrast extravasation in the distal descending colon/proximal sigmoid, compatible with active intestinal hemorrhage. IR c/s for embolization.   ED VS, T(F) Max: 98.5 HR: 63 BP: 89/53 MAP 66 RR: 18 SpO2: 99% in RA. Labs notable for Hb 12.4 <- 14.6. CXR unremarkable.     Admitted for hypovolemic shock due to ongoing intestinal hemorrhage.     11/5 IR angiography - DALJIT and selective sigmoid angiography withoutevidence of active contrast extravasation. The sigmoid arcade vessels were very small caliber due to vasoconstriction. The patient remained hemodynamically stable throughout the procedure. Findings discussed with gastroenterology.    11/5 -Colonoscopy findings: Severe diverticulosis throughout the colon. Blood clots and stools limited the visualization but no evidence of active bleeding was noted.     Hypovolemic shock / Hemorrhagic shock   LGIB   HO Diverticulitis SP partial large bowel resection    HO HTN    #Hypovolemic shock - resolved   #LGIB  #DIverticulosis   #HO diverticultis s/p colectomy   - s/p 4 units prbc 1 unit ffp   - s/p IR angio- no intervention   - s/p colonscopy no intervention   - now HD stable, with hgb stable   - monitor CBC q12hr   - keep hgb >8   - maintain active type and screen   - clear liquid diet   - pt refusing colonscopy at [facility]   - pantoprazole 40mg IV BID   - abx will switch to zosyn 3.375mg q6hr   -     #HTN  home regimen: amlodipine 5mg qd/ lisinopril 40mg qd/lasix 20mg qd   - monitor off meds     #HLD   - continue simvastatin 20mg qd     #depression  - continue sertraline 100mg qd     DVT ppx- none for now   GI ppx - pantoprazole 40mg iv bid   Diet clears   Code full

## 2021-11-09 NOTE — DISCHARGE NOTE PROVIDER - NSDCCPCAREPLAN_GEN_ALL_CORE_FT
PRINCIPAL DISCHARGE DIAGNOSIS  Diagnosis: GI bleed  Assessment and Plan of Treatment: you came in after having bloddy bowel movements   you had a drop in hhemoglobin, and your BP was low  you were given 4 units of blood and 1 unit of plasma   you had a CT angiogram that showed active bleeding in the colon   You underwent an IR angiogram but no active bleeding was noted  you underwent a colonscopy - severe diverticulosis was noted, but stool and blood limited the abiltity to evaluate fully - no active bleeding was seen   Gastroenterology recommended a repeat colonscopy with better prep but you refused and wished to be transferred to [place] for further evaluation

## 2021-11-09 NOTE — DISCHARGE NOTE PROVIDER - PROVIDER TOKENS
PROVIDER:[TOKEN:[[id number]],FOLLOWUP:[1 week]],PROVIDER:[TOKEN:[[id number]],FOLLOWUP:[2 weeks]],PROVIDER:[TOKEN:[[id number]],FOLLOWUP:[2 weeks]]

## 2021-11-10 LAB
ALBUMIN SERPL ELPH-MCNC: 3.9 G/DL — SIGNIFICANT CHANGE UP (ref 3.5–5.2)
ALP SERPL-CCNC: 75 U/L — SIGNIFICANT CHANGE UP (ref 30–115)
ALT FLD-CCNC: 17 U/L — SIGNIFICANT CHANGE UP (ref 0–41)
ANION GAP SERPL CALC-SCNC: 16 MMOL/L — HIGH (ref 7–14)
AST SERPL-CCNC: 16 U/L — SIGNIFICANT CHANGE UP (ref 0–41)
BASOPHILS # BLD AUTO: 0.06 K/UL — SIGNIFICANT CHANGE UP (ref 0–0.2)
BASOPHILS NFR BLD AUTO: 0.9 % — SIGNIFICANT CHANGE UP (ref 0–1)
BILIRUB SERPL-MCNC: 1 MG/DL — SIGNIFICANT CHANGE UP (ref 0.2–1.2)
BUN SERPL-MCNC: 6 MG/DL — LOW (ref 10–20)
CALCIUM SERPL-MCNC: 8.8 MG/DL — SIGNIFICANT CHANGE UP (ref 8.5–10.1)
CHLORIDE SERPL-SCNC: 103 MMOL/L — SIGNIFICANT CHANGE UP (ref 98–110)
CO2 SERPL-SCNC: 23 MMOL/L — SIGNIFICANT CHANGE UP (ref 17–32)
CREAT SERPL-MCNC: 0.9 MG/DL — SIGNIFICANT CHANGE UP (ref 0.7–1.5)
CULTURE RESULTS: SIGNIFICANT CHANGE UP
EOSINOPHIL # BLD AUTO: 0.25 K/UL — SIGNIFICANT CHANGE UP (ref 0–0.7)
EOSINOPHIL NFR BLD AUTO: 3.7 % — SIGNIFICANT CHANGE UP (ref 0–8)
ERYTHROCYTE [SEDIMENTATION RATE] IN BLOOD: 82 MM/HR — HIGH (ref 0–10)
GLUCOSE SERPL-MCNC: 110 MG/DL — HIGH (ref 70–99)
HCT VFR BLD CALC: 31.7 % — LOW (ref 42–52)
HGB BLD-MCNC: 10.6 G/DL — LOW (ref 14–18)
IMM GRANULOCYTES NFR BLD AUTO: 0.4 % — HIGH (ref 0.1–0.3)
LYMPHOCYTES # BLD AUTO: 1.55 K/UL — SIGNIFICANT CHANGE UP (ref 1.2–3.4)
LYMPHOCYTES # BLD AUTO: 22.7 % — SIGNIFICANT CHANGE UP (ref 20.5–51.1)
MAGNESIUM SERPL-MCNC: 1.8 MG/DL — SIGNIFICANT CHANGE UP (ref 1.8–2.4)
MCHC RBC-ENTMCNC: 29.9 PG — SIGNIFICANT CHANGE UP (ref 27–31)
MCHC RBC-ENTMCNC: 33.4 G/DL — SIGNIFICANT CHANGE UP (ref 32–37)
MCV RBC AUTO: 89.3 FL — SIGNIFICANT CHANGE UP (ref 80–94)
MONOCYTES # BLD AUTO: 0.46 K/UL — SIGNIFICANT CHANGE UP (ref 0.1–0.6)
MONOCYTES NFR BLD AUTO: 6.7 % — SIGNIFICANT CHANGE UP (ref 1.7–9.3)
NEUTROPHILS # BLD AUTO: 4.48 K/UL — SIGNIFICANT CHANGE UP (ref 1.4–6.5)
NEUTROPHILS NFR BLD AUTO: 65.6 % — SIGNIFICANT CHANGE UP (ref 42.2–75.2)
NRBC # BLD: 0 /100 WBCS — SIGNIFICANT CHANGE UP (ref 0–0)
PLATELET # BLD AUTO: 223 K/UL — SIGNIFICANT CHANGE UP (ref 130–400)
POTASSIUM SERPL-MCNC: 3.6 MMOL/L — SIGNIFICANT CHANGE UP (ref 3.5–5)
POTASSIUM SERPL-SCNC: 3.6 MMOL/L — SIGNIFICANT CHANGE UP (ref 3.5–5)
PROT SERPL-MCNC: 5.9 G/DL — LOW (ref 6–8)
RBC # BLD: 3.55 M/UL — LOW (ref 4.7–6.1)
RBC # FLD: 13.3 % — SIGNIFICANT CHANGE UP (ref 11.5–14.5)
SODIUM SERPL-SCNC: 142 MMOL/L — SIGNIFICANT CHANGE UP (ref 135–146)
SPECIMEN SOURCE: SIGNIFICANT CHANGE UP
WBC # BLD: 6.83 K/UL — SIGNIFICANT CHANGE UP (ref 4.8–10.8)
WBC # FLD AUTO: 6.83 K/UL — SIGNIFICANT CHANGE UP (ref 4.8–10.8)

## 2021-11-10 PROCEDURE — 99233 SBSQ HOSP IP/OBS HIGH 50: CPT

## 2021-11-10 RX ORDER — POTASSIUM CHLORIDE 20 MEQ
20 PACKET (EA) ORAL
Refills: 0 | Status: COMPLETED | OUTPATIENT
Start: 2021-11-10 | End: 2021-11-10

## 2021-11-10 RX ORDER — ALPRAZOLAM 0.25 MG
0.5 TABLET ORAL ONCE
Refills: 0 | Status: DISCONTINUED | OUTPATIENT
Start: 2021-11-10 | End: 2021-11-10

## 2021-11-10 RX ORDER — SENNA PLUS 8.6 MG/1
2 TABLET ORAL DAILY
Refills: 0 | Status: DISCONTINUED | OUTPATIENT
Start: 2021-11-10 | End: 2021-11-11

## 2021-11-10 RX ADMIN — PANTOPRAZOLE SODIUM 40 MILLIGRAM(S): 20 TABLET, DELAYED RELEASE ORAL at 05:16

## 2021-11-10 RX ADMIN — OXYCODONE AND ACETAMINOPHEN 2 TABLET(S): 5; 325 TABLET ORAL at 21:11

## 2021-11-10 RX ADMIN — PIPERACILLIN AND TAZOBACTAM 25 GRAM(S): 4; .5 INJECTION, POWDER, LYOPHILIZED, FOR SOLUTION INTRAVENOUS at 05:16

## 2021-11-10 RX ADMIN — Medication 20 MILLIEQUIVALENT(S): at 12:26

## 2021-11-10 RX ADMIN — Medication 1 TABLET(S): at 13:33

## 2021-11-10 RX ADMIN — Medication 3 MILLIGRAM(S): at 21:11

## 2021-11-10 RX ADMIN — SIMVASTATIN 20 MILLIGRAM(S): 20 TABLET, FILM COATED ORAL at 21:11

## 2021-11-10 RX ADMIN — Medication 1 TABLET(S): at 21:11

## 2021-11-10 RX ADMIN — CHLORHEXIDINE GLUCONATE 1 APPLICATION(S): 213 SOLUTION TOPICAL at 05:16

## 2021-11-10 RX ADMIN — PIPERACILLIN AND TAZOBACTAM 25 GRAM(S): 4; .5 INJECTION, POWDER, LYOPHILIZED, FOR SOLUTION INTRAVENOUS at 12:26

## 2021-11-10 RX ADMIN — SENNA PLUS 2 TABLET(S): 8.6 TABLET ORAL at 13:32

## 2021-11-10 RX ADMIN — SERTRALINE 100 MILLIGRAM(S): 25 TABLET, FILM COATED ORAL at 12:26

## 2021-11-10 RX ADMIN — PANTOPRAZOLE SODIUM 40 MILLIGRAM(S): 20 TABLET, DELAYED RELEASE ORAL at 17:16

## 2021-11-10 RX ADMIN — Medication 20 MILLIEQUIVALENT(S): at 15:00

## 2021-11-10 RX ADMIN — Medication 0.5 MILLIGRAM(S): at 21:54

## 2021-11-10 NOTE — PROGRESS NOTE ADULT - SUBJECTIVE AND OBJECTIVE BOX
KRISTINA MCKEON 61y Male  MRN#: [id number]   CODE STATUS: full code     Hospital Day: 5d    Pt is currently admitted with the primary diagnosis of diverticular bleed     SUBJECTIVE    no events overnight, denies fever chills, nvd, no bowel movements, no urinary sxs, no cp palpitations                                               ----------------------------------------------------------  OBJECTIVE  PAST MEDICAL & SURGICAL HISTORY  Diverticulitis    HTN (hypertension)    HLD (hyperlipidemia)    H/O left knee surgery    S/P partial colectomy  2019                                              -----------------------------------------------------------  ALLERGIES:  No Known Allergies                                            ------------------------------------------------------------    HOME MEDICATIONS  Home Medications:  amLODIPine 10 mg oral tablet: 1 tab(s) orally once a day (05 Nov 2021 05:04)  Bystolic 5 mg oral tablet: 1 tab(s) orally once a day (16 Sep 2019 17:23)  furosemide 20 mg oral tablet: 1 tab(s) orally once a day (16 Sep 2019 17:23)  lisinopril 40 mg oral tablet: 1 tab(s) orally once a day (16 Sep 2019 17:23)  sertraline 100 mg oral tablet: 1 tab(s) orally once a day (16 Sep 2019 17:23)  simvastatin 20 mg oral tablet: 1 tab(s) orally once a day (at bedtime) (16 Sep 2019 17:23)                           MEDICATIONS:  STANDING MEDICATIONS  chlorhexidine 4% Liquid 1 Application(s) Topical <User Schedule>  influenza   Vaccine 0.5 milliLiter(s) IntraMuscular once  melatonin 3 milliGRAM(s) Oral at bedtime  pantoprazole  Injectable 40 milliGRAM(s) IV Push every 12 hours  piperacillin/tazobactam IVPB.. 3.375 Gram(s) IV Intermittent every 6 hours  sertraline 100 milliGRAM(s) Oral daily  simvastatin 20 milliGRAM(s) Oral at bedtime    PRN MEDICATIONS  acetaminophen     Tablet .. 650 milliGRAM(s) Oral every 6 hours PRN  oxycodone    5 mG/acetaminophen 325 mG 2 Tablet(s) Oral every 6 hours PRN                                            ------------------------------------------------------------  VITAL SIGNS: Last 24 Hours  T(C): 36.4 (10 Nov 2021 08:00), Max: 36.4 (09 Nov 2021 11:30)  T(F): 97.6 (10 Nov 2021 08:00), Max: 97.6 (09 Nov 2021 11:30)  HR: 68 (10 Nov 2021 08:00) (68 - 74)  BP: 141/86 (10 Nov 2021 08:00) (118/64 - 146/76)  BP(mean): --  RR: 18 (10 Nov 2021 08:00) (18 - 18)  SpO2: 97% (09 Nov 2021 11:30) (97% - 97%)                                             --------------------------------------------------------------  LABS:                        10.6   6.83  )-----------( 223      ( 10 Nov 2021 08:15 )             31.7     11-10    142  |  103  |  6<L>  ----------------------------<  110<H>  3.6   |  23  |  0.9    Ca    8.8      10 Nov 2021 08:15  Mg     1.8     11-10    TPro  5.9<L>  /  Alb  3.9  /  TBili  1.0  /  DBili  x   /  AST  16  /  ALT  17  /  AlkPhos  75  11-10          Sedimentation Rate, Erythrocyte: 82 mm/Hr *H* (11-10-21 @ 08:15)                                                      -------------------------------------------------------------  RADIOLOGY:      EXAM:  CT ANGIO ABD PELV (W)AW IC            PROCEDURE DATE:  11/05/2021            INTERPRETATION:  CLINICAL HISTORY/REASON FOR EXAM: Rectal bleeding. History of diverticulitis status post partial colon resection.    TECHNIQUE: Contiguous axial CTimages were obtained from the chest to the pubic symphysis following administration of 95 cc Optiray 320 intravenous contrast.  0cc of contrast were discarded and without IV contrast.  Oral contrast was administered.  Reformatted images in the coronal and sagittal planes were acquired.    COMPARISON: CT abdomen pelvis with oral contrast 11/4/2021.    FINDINGS:    CT ANGIOGRAPHY:    The abdominal aorta is normal in caliber.  The celiac artery, SMA, bilateral renal arteries and DALJIT are patent. No evidence of aneurysm or dissection.    There is active intraluminal contrast extravasation in the distal descending colon/proximal sigmoid (series 5/125).    HEPATOBILIARY: Unchanged.    SPLEEN: Unchanged.    PANCREAS: Unchanged.    ADRENAL GLANDS: Unchanged.    KIDNEYS: Unchanged.    ABDOMINOPELVIC NODES: Unchanged.    PELVIC ORGANS: Unchanged.    PERITONEUM/MESENTERY/BOWEL: Oral contrast is seen within the ascending and transverse colon, limiting evaluation. No bowel obstruction, pneumoperitoneumor ascites. Colonic diverticulosis. Normal appendix. There is active intraluminal contrast extravasation in the distal descending colon/proximal sigmoid (series 5/125).    BONES/SOFT TISSUES: Unchanged.      IMPRESSION:  1. Active intraluminal contrast extravasation in the distal descending colon/proximal sigmoid, compatible with active intestinal hemorrhage.    2. Oral contrast has reached distal transverse colon.    3. Colonic diverticulosis.    Dr. Brian Ojeda discussed preliminary findings with SONYA RONDON MD on 11/5/2021 2:33 AM with readback.    --- End of Report ---                                            --------------------------------------------------------------    PHYSICAL EXAM:  GENERAL:  comfortable, in NAD   HEENT: NCAT  LUNGS:  air entry bilaterally   HEART: RRR, +S1,S2, RRR  ABDOMEN: left lower quadrant tenderness no rebound guarding   EXT: Warm, well perfused x 4  NEURO: AxOx3, No FND's noted  SKIN: No new breakdown or rashes noted

## 2021-11-10 NOTE — DIETITIAN INITIAL EVALUATION ADULT. - OTHER INFO
pertinent medical information:   --62 yo M PMHx of diverticulitis s/p partial colon resection 2019 at [facility], HTN, DLD transferred from [facility] for recurrence of hematochezia +/- pain in the setting of hypotensive.  #Acute blood loss anemia  #Hypovolemic shock - resolved   #LGIB  #DIiverticulosis   #HO diverticultis s/p colectomy - now HD stable, with hgb stable; ~10  - clear liquid diet   - pt refusing colonscopy at [facility] and requesting transfer to [facility] where he had previous colorectal surgery but insurance denied his request. currently appealing and having accepting physician at [facility] do a peer- to- peer    pertinent medical information: Pt tolerating current clear diet. No chewing/swallowing difficulty reported. Discussed addition of clear nutrition supplements -- agreeable to add.

## 2021-11-10 NOTE — PROGRESS NOTE ADULT - ATTENDING COMMENTS
***My note supersedes any discrepancies that may be above in the resident's note    60 yo M PMHx of diverticulitis s/p partial colon resection 2019 at [place], HTN, DLD transferred from [facility] for recurrence of hematochezia +/- pain in the setting of hypotensive. Hospital stay c/b code fusion called for initiation of MTP.     Gen- middle-age M, NAD, non toxic appearing, non toxic  Eyes- anicteric sclera, non injected conjunctiva, EOMI  ENT- hearing grossly intact, oropharynx clear, MMM  Chest- symmetrical chest rise, CTAB, no wheezing or coarse breath sounds  Cardiac- RRR, normal s1s2, no RMG appreciated, no LE edema  Abdominal- soft, non distended, ttp in LLQ  Ext- no clubbing or cyanosis, spontaneously moving all 4 ext  SKin- warm, dry, intact  Neuro- AOx3, normal mentation, CN 2-12 grossly intact  Rectal- deferred    #Acute blood loss anemia  #Hypovolemic shock - resolved   #LGIB  #DIiverticulosis   #HO diverticultis s/p colectomy   - s/p 4 units prbc 1 unit ffp   - CTA A/P showing active intraluminal contrast extravasation in the distal descending colon/proximal sigmoid, compatible with active intestinal hemorrhage.   - 11/5 IR angiography - DALJIT and selective sigmoid angiography without evidence of active contrast extravasation. The sigmoid arcade vessels were very small caliber due to vasoconstriction. No intervention  - 11/5 Colonoscopy findings: Severe diverticulosis throughout the colon. Blood clots and stools limited the visualization but no evidence of active bleeding was noted.   - now HD stable, with hgb stable   - monitor CBC q12hr   - keep hgb >8   - maintain active type and screen   - clear liquid diet   - pt refusing colonscopy at [facility] and requesting transfer to [facility] where he had previous colorectal surgery. awaiting insurance auth   - pantoprazole 40mg IV BID   - abx will switch to zosyn 3.375mg q6hr     #HTN  home regimen: amlodipine 5mg qd/ lisinopril 40mg qd/lasix 20mg qd   - monitor off meds     #HLD   - continue simvastatin 20mg qd     #depression  - continue sertraline 100mg qd     DVT ppx- none for now   GI ppx - pantoprazole 40mg iv bid   Diet clears   Code full     #Progress Note Handoff  [facility]  Family discussion: spoke to patient and wife at length about the nature of diverticular bleeds and the difficulty in catching them and treating them during active bleeding. The acknowledge and prefer to go to [place] in hopes of spoking to surgeon about additional surgery. However, If he re-bleeds they are willing to do scope here.  Disposition: Unknown at this time________
***My notes supersedes any discrepancies that may be above in the resident's note***    60 yo M PMHx of diverticulitis s/p partial colon resection 2019 at [place], HTN, DLD transferred from [facility] for recurrence of hematochezia +/- pain in the setting of hypotensive. Hospital stay c/b code fusion called for initiation of MTP.     Gen- middle-age M, NAD, non toxic appearing, non toxic  Eyes- anicteric sclera, non injected conjunctiva, EOMI  ENT- hearing grossly intact, oropharynx clear, MMM  Chest- symmetrical chest rise, CTAB, no wheezing or coarse breath sounds  Cardiac- RRR, normal s1s2, no RMG appreciated, no LE edema  Abdominal- soft, non distended, ttp in LLQ  Ext- no clubbing or cyanosis, spontaneously moving all 4 ext  SKin- warm, dry, intact  Neuro- AOx3, normal mentation, CN 2-12 grossly intact  Rectal- deferred    #Acute blood loss anemia  #Hypovolemic shock - resolved   #LGIB  #DIiverticulosis   #HO diverticultis s/p colectomy   - s/p 4 units prbc 1 unit ffp   - CTA A/P showing active intraluminal contrast extravasation in the distal descending colon/proximal sigmoid, compatible with active intestinal hemorrhage.   - 11/5 IR angiography - DALJIT and selective sigmoid angiography without evidence of active contrast extravasation. The sigmoid arcade vessels were very small caliber due to vasoconstriction. No intervention  - 11/5 Colonoscopy findings: Severe diverticulosis throughout the colon. Blood clots and stools limited the visualization but no evidence of active bleeding was noted.   - now HD stable, with hgb stable; ~10  - monitor CBC q12hr   - keep hgb >8   - maintain active type and screen   - clear liquid diet   - pt refusing colonscopy at [facility] and requesting transfer to [facility] where he had previous colorectal surgery but insurance denied his request. currently appealing and having accepting physician at [facility] do a peer- to- peer  - pantoprazole 40mg IV BID   - abx will switch to zosyn 3.375mg q6hr (11/9)    #HTN  home regimen: amlodipine 5mg qd/ lisinopril 40mg qd/lasix 20mg qd   - monitor off meds     #HLD   - continue simvastatin 20mg qd     #depression  - continue sertraline 100mg qd     DVT ppx- none for now   GI ppx - pantoprazole 40mg iv bid   Diet clears   Code full     #Progress Note Handoff  Pending (specify):  transfer to [place]  Family discussion: spoke to patient and wife at length about the nature of diverticular bleeds and the difficulty in catching them and treating them during active bleeding. The acknowledge and prefer to go to [place] in hopes of spoking to surgeon about additional surgery. Insurnace denied transfer but he is appealing. Will consult ID for oral abx regimen for presumed superimposed diverticulitis so patient can potentially just be discharged and follow up with [place] as outpatient given he is HDS and H/H are stable.   Disposition: Unknown at this time________ .
I edited the note

## 2021-11-10 NOTE — DIETITIAN INITIAL EVALUATION ADULT. - PERTINENT MEDS FT
Admission MEDICATIONS  (STANDING):  amoxicillin  875 milliGRAM(s)/clavulanate 1 Tablet(s) Oral every 8 hours  melatonin 3 milliGRAM(s) Oral at bedtime  pantoprazole  Injectable 40 milliGRAM(s) IV Push every 12 hours  potassium chloride    Tablet ER 20 milliEquivalent(s) Oral every 2 hours  senna 2 Tablet(s) Oral daily  sertraline 100 milliGRAM(s) Oral daily  simvastatin 20 milliGRAM(s) Oral at bedtime    MEDICATIONS  (PRN):  oxycodone    5 mG/acetaminophen 325 mG 2 Tablet(s) Oral every 6 hours PRN Moderate Pain (4 - 6)

## 2021-11-10 NOTE — DIETITIAN INITIAL EVALUATION ADULT. - ADD RECOMMEND
1. advance diet per GI reccs, Goal diet: DASH, low fiber/residue. 2. On clears diet, please add Ensure Clear TID.

## 2021-11-10 NOTE — DIETITIAN INITIAL EVALUATION ADULT. - ORAL INTAKE PTA/DIET HISTORY
PTA Po/appetite >75%. eats 2-3 meals/day. came into the hospital d/t pain and blood per rectum. Otherwise no other symptoms. UBW: 122.7-129.5kg -- no wt loss noted/reported. NKFA. No MVI. no cul/rel or personal food rest/prefs.

## 2021-11-10 NOTE — DIETITIAN INITIAL EVALUATION ADULT. - PHYSCIAL ASSESSMENT
WDL; 11/5: 1+ l arm edema; GI: WDL, LBM none since 11/8, no blood; skin: ecchymosis; wts: 11/6: 136, 11/7: 133.6kg -- will use lowest dosing wt 127kg for calculations. Ht incorrect in EMR -- correct ht 74 inch

## 2021-11-10 NOTE — DIETITIAN INITIAL EVALUATION ADULT. - PERSON TAUGHT/METHOD
High fiber diet long-term d/t diverticulosis dx, low fiber right now d/t diverticulitis until condition resolves; Discussed benefits of nutritional supplements/verbal instruction/patient instructed

## 2021-11-10 NOTE — PROGRESS NOTE ADULT - ASSESSMENT
Assessment   60 yo M PMHx of diverticulitis s/p partial colon resection 2019 at [place], HTN, DLD transferred from [facility] due to BRBPR x1d.   Per wife at bedside, states pt began to feel LLQ pain one week ago, contacted Dr Nava who prescribed a course of two antibiotics (does not recall names), pt completed abx course with no improvement in pain. Yesterday experienced BRBPR x4, went to [facility], [place] A/P without abnormalities and stable Hb so was d/c home. BRBPR continued, [place], given prbc x2 and transferred to [place]. Hypotensive on admission, 60/40, code fusion called for initiation of MTP. Pt is s/p 2 additional units prbcs, platelet x1 and calcium gluconate x1, 2L LR bolus. BP improved to MAP 67 and pt did not need pressor support.   CTA A/P showing active intraluminal contrast extravasation in the distal descending colon/proximal sigmoid, compatible with active intestinal hemorrhage. IR c/s for embolization.   ED VS, T(F) Max: 98.5 HR: 63 BP: 89/53 MAP 66 RR: 18 SpO2: 99% in RA. Labs notable for Hb 12.4 <- 14.6. CXR unremarkable.   Admitted for hypovolemic shock due to ongoing intestinal hemorrhage.     Hypovolemic shock / Hemorrhagic shock   LGIB   HO Diverticulitis SP partial large bowel resection    HO HTN    #Hypovolemic shock - resolved   #LGIB  #DIverticulosis   #HO diverticultis s/p colectomy   - s/p 4 units prbc 1 unit ffp   - 11/5 IR angiography - DALJIT and selective sigmoid angiography withoutevidence of active contrast extravasation. The sigmoid arcade vessels were very small caliber due to vasoconstriction. The patient remained hemodynamically stable throughout the procedure. Findings discussed with gastroenterology.  - 11/5 -Colonoscopy findings: Severe diverticulosis throughout the colon. Blood clots and stools limited the visualization but no evidence of active bleeding was noted.   - now HD stable, with hgb stable   - monitor CBC q12hr   - keep hgb >8   - maintain active type and screen   - advanced to full liquid diet   - pt refusing colonscopy at [facility] would like to be transferred to [place]   - pantoprazole 40mg IV BID   - abx switched to zosyn 3.375mg q6hr       #HTN  home regimen: amlodipine 5mg qd/ lisinopril 40mg qd/lasix 20mg qd   - monitor off meds     #HLD   - continue simvastatin 20mg qd     #depression  - continue sertraline 100mg qd     DVT ppx- none for now   GI ppx - pantoprazole 40mg iv bid   Diet full code    Code full

## 2021-11-10 NOTE — DIETITIAN INITIAL EVALUATION ADULT. - OTHER CALCULATIONS
using ABW 127kg and IBW 86.3kg; Energy: 2148kcal (MSJ 1.0 d/t obese BMI); protein: 103- 120g/day (1.2-1.4g/day -- d/t sig diff btwn IBW/ABW); fluid: 1mL/kcal or LIP

## 2021-11-11 ENCOUNTER — TRANSCRIPTION ENCOUNTER (OUTPATIENT)
Age: 61
End: 2021-11-11

## 2021-11-11 VITALS
RESPIRATION RATE: 18 BRPM | HEART RATE: 60 BPM | DIASTOLIC BLOOD PRESSURE: 83 MMHG | SYSTOLIC BLOOD PRESSURE: 137 MMHG | TEMPERATURE: 96 F

## 2021-11-11 LAB
ALBUMIN SERPL ELPH-MCNC: 3.7 G/DL — SIGNIFICANT CHANGE UP (ref 3.5–5.2)
ALP SERPL-CCNC: 75 U/L — SIGNIFICANT CHANGE UP (ref 30–115)
ALT FLD-CCNC: 17 U/L — SIGNIFICANT CHANGE UP (ref 0–41)
ANION GAP SERPL CALC-SCNC: 15 MMOL/L — HIGH (ref 7–14)
AST SERPL-CCNC: 17 U/L — SIGNIFICANT CHANGE UP (ref 0–41)
BASOPHILS # BLD AUTO: 0.05 K/UL — SIGNIFICANT CHANGE UP (ref 0–0.2)
BASOPHILS # BLD AUTO: 0.06 K/UL — SIGNIFICANT CHANGE UP (ref 0–0.2)
BASOPHILS NFR BLD AUTO: 0.6 % — SIGNIFICANT CHANGE UP (ref 0–1)
BASOPHILS NFR BLD AUTO: 0.9 % — SIGNIFICANT CHANGE UP (ref 0–1)
BILIRUB SERPL-MCNC: 0.9 MG/DL — SIGNIFICANT CHANGE UP (ref 0.2–1.2)
BUN SERPL-MCNC: 7 MG/DL — LOW (ref 10–20)
CALCIUM SERPL-MCNC: 8.7 MG/DL — SIGNIFICANT CHANGE UP (ref 8.5–10.1)
CHLORIDE SERPL-SCNC: 104 MMOL/L — SIGNIFICANT CHANGE UP (ref 98–110)
CO2 SERPL-SCNC: 21 MMOL/L — SIGNIFICANT CHANGE UP (ref 17–32)
CREAT SERPL-MCNC: 0.9 MG/DL — SIGNIFICANT CHANGE UP (ref 0.7–1.5)
CRP SERPL-MCNC: 22 MG/L — HIGH
EOSINOPHIL # BLD AUTO: 0.22 K/UL — SIGNIFICANT CHANGE UP (ref 0–0.7)
EOSINOPHIL # BLD AUTO: 0.23 K/UL — SIGNIFICANT CHANGE UP (ref 0–0.7)
EOSINOPHIL NFR BLD AUTO: 2.8 % — SIGNIFICANT CHANGE UP (ref 0–8)
EOSINOPHIL NFR BLD AUTO: 3.3 % — SIGNIFICANT CHANGE UP (ref 0–8)
GLUCOSE SERPL-MCNC: 92 MG/DL — SIGNIFICANT CHANGE UP (ref 70–99)
HCT VFR BLD CALC: 29.7 % — LOW (ref 42–52)
HCT VFR BLD CALC: 30.3 % — LOW (ref 42–52)
HGB BLD-MCNC: 10 G/DL — LOW (ref 14–18)
HGB BLD-MCNC: 10.4 G/DL — LOW (ref 14–18)
IMM GRANULOCYTES NFR BLD AUTO: 0.4 % — HIGH (ref 0.1–0.3)
IMM GRANULOCYTES NFR BLD AUTO: 0.5 % — HIGH (ref 0.1–0.3)
LYMPHOCYTES # BLD AUTO: 1.8 K/UL — SIGNIFICANT CHANGE UP (ref 1.2–3.4)
LYMPHOCYTES # BLD AUTO: 2.22 K/UL — SIGNIFICANT CHANGE UP (ref 1.2–3.4)
LYMPHOCYTES # BLD AUTO: 26.7 % — SIGNIFICANT CHANGE UP (ref 20.5–51.1)
LYMPHOCYTES # BLD AUTO: 26.9 % — SIGNIFICANT CHANGE UP (ref 20.5–51.1)
MAGNESIUM SERPL-MCNC: 1.9 MG/DL — SIGNIFICANT CHANGE UP (ref 1.8–2.4)
MCHC RBC-ENTMCNC: 30.1 PG — SIGNIFICANT CHANGE UP (ref 27–31)
MCHC RBC-ENTMCNC: 30.6 PG — SIGNIFICANT CHANGE UP (ref 27–31)
MCHC RBC-ENTMCNC: 33.7 G/DL — SIGNIFICANT CHANGE UP (ref 32–37)
MCHC RBC-ENTMCNC: 34.3 G/DL — SIGNIFICANT CHANGE UP (ref 32–37)
MCV RBC AUTO: 89.1 FL — SIGNIFICANT CHANGE UP (ref 80–94)
MCV RBC AUTO: 89.5 FL — SIGNIFICANT CHANGE UP (ref 80–94)
MONOCYTES # BLD AUTO: 0.52 K/UL — SIGNIFICANT CHANGE UP (ref 0.1–0.6)
MONOCYTES # BLD AUTO: 0.6 K/UL — SIGNIFICANT CHANGE UP (ref 0.1–0.6)
MONOCYTES NFR BLD AUTO: 7.2 % — SIGNIFICANT CHANGE UP (ref 1.7–9.3)
MONOCYTES NFR BLD AUTO: 7.8 % — SIGNIFICANT CHANGE UP (ref 1.7–9.3)
NEUTROPHILS # BLD AUTO: 4.07 K/UL — SIGNIFICANT CHANGE UP (ref 1.4–6.5)
NEUTROPHILS # BLD AUTO: 5.16 K/UL — SIGNIFICANT CHANGE UP (ref 1.4–6.5)
NEUTROPHILS NFR BLD AUTO: 60.7 % — SIGNIFICANT CHANGE UP (ref 42.2–75.2)
NEUTROPHILS NFR BLD AUTO: 62.2 % — SIGNIFICANT CHANGE UP (ref 42.2–75.2)
NRBC # BLD: 0 /100 WBCS — SIGNIFICANT CHANGE UP (ref 0–0)
NRBC # BLD: 0 /100 WBCS — SIGNIFICANT CHANGE UP (ref 0–0)
PLATELET # BLD AUTO: 225 K/UL — SIGNIFICANT CHANGE UP (ref 130–400)
PLATELET # BLD AUTO: 228 K/UL — SIGNIFICANT CHANGE UP (ref 130–400)
POTASSIUM SERPL-MCNC: 3.7 MMOL/L — SIGNIFICANT CHANGE UP (ref 3.5–5)
POTASSIUM SERPL-SCNC: 3.7 MMOL/L — SIGNIFICANT CHANGE UP (ref 3.5–5)
PROT SERPL-MCNC: 5.6 G/DL — LOW (ref 6–8)
RBC # BLD: 3.32 M/UL — LOW (ref 4.7–6.1)
RBC # BLD: 3.4 M/UL — LOW (ref 4.7–6.1)
RBC # FLD: 13.2 % — SIGNIFICANT CHANGE UP (ref 11.5–14.5)
RBC # FLD: 13.5 % — SIGNIFICANT CHANGE UP (ref 11.5–14.5)
SODIUM SERPL-SCNC: 140 MMOL/L — SIGNIFICANT CHANGE UP (ref 135–146)
WBC # BLD: 6.7 K/UL — SIGNIFICANT CHANGE UP (ref 4.8–10.8)
WBC # BLD: 8.3 K/UL — SIGNIFICANT CHANGE UP (ref 4.8–10.8)
WBC # FLD AUTO: 6.7 K/UL — SIGNIFICANT CHANGE UP (ref 4.8–10.8)
WBC # FLD AUTO: 8.3 K/UL — SIGNIFICANT CHANGE UP (ref 4.8–10.8)

## 2021-11-11 PROCEDURE — 99239 HOSP IP/OBS DSCHRG MGMT >30: CPT

## 2021-11-11 RX ORDER — POLYETHYLENE GLYCOL 3350 17 G/17G
17 POWDER, FOR SOLUTION ORAL ONCE
Refills: 0 | Status: COMPLETED | OUTPATIENT
Start: 2021-11-11 | End: 2021-11-11

## 2021-11-11 RX ORDER — PANTOPRAZOLE SODIUM 20 MG/1
1 TABLET, DELAYED RELEASE ORAL
Qty: 30 | Refills: 0
Start: 2021-11-11 | End: 2021-12-10

## 2021-11-11 RX ORDER — LISINOPRIL 2.5 MG/1
1 TABLET ORAL
Qty: 0 | Refills: 0 | DISCHARGE

## 2021-11-11 RX ORDER — AMLODIPINE BESYLATE 2.5 MG/1
1 TABLET ORAL
Qty: 0 | Refills: 0 | DISCHARGE

## 2021-11-11 RX ORDER — PANTOPRAZOLE SODIUM 20 MG/1
1 TABLET, DELAYED RELEASE ORAL
Qty: 60 | Refills: 0
Start: 2021-11-11 | End: 2021-12-10

## 2021-11-11 RX ORDER — NEBIVOLOL HYDROCHLORIDE 5 MG/1
1 TABLET ORAL
Qty: 0 | Refills: 0 | DISCHARGE

## 2021-11-11 RX ADMIN — PANTOPRAZOLE SODIUM 40 MILLIGRAM(S): 20 TABLET, DELAYED RELEASE ORAL at 05:05

## 2021-11-11 RX ADMIN — CHLORHEXIDINE GLUCONATE 1 APPLICATION(S): 213 SOLUTION TOPICAL at 05:04

## 2021-11-11 RX ADMIN — POLYETHYLENE GLYCOL 3350 17 GRAM(S): 17 POWDER, FOR SOLUTION ORAL at 10:35

## 2021-11-11 RX ADMIN — Medication 1 TABLET(S): at 05:05

## 2021-11-11 RX ADMIN — SERTRALINE 100 MILLIGRAM(S): 25 TABLET, FILM COATED ORAL at 11:05

## 2021-11-11 NOTE — PROGRESS NOTE ADULT - ASSESSMENT
Assessment   62 yo M PMHx of diverticulitis s/p partial colon resection 2019 at [place], HTN, DLD transferred from [facility] due to BRBPR x1d.   Per wife at bedside, states pt began to feel LLQ pain one week ago, contacted Dr Nava who prescribed a course of two antibiotics (does not recall names), pt completed abx course with no improvement in pain. Yesterday experienced BRBPR x4, went to [facility], CT A/P without abnormalities and stable Hb so was d/c home. BRBPR continued, pt was brought back [place], given prbc x2 and transferred to [place]. Hypotensive on admission, 60/40, code fusion called for initiation of MTP. Pt is s/p 2 additional units prbcs, platelet x1 and calcium gluconate x1, 2L LR bolus. BP improved to MAP 67 and pt did not need pressor support.   CTA A/P showing active intraluminal contrast extravasation in the distal descending colon/proximal sigmoid, compatible with active intestinal hemorrhage. IR c/s for embolization.   ED VS, T(F) Max: 98.5 HR: 63 BP: 89/53 MAP 66 RR: 18 SpO2: 99% in RA. Labs notable for Hb 12.4 <- 14.6. CXR unremarkable.   Admitted for hypovolemic shock due to ongoing intestinal hemorrhage.     Hypovolemic shock / Hemorrhagic shock   LGIB   HO Diverticulitis SP partial large bowel resection    HO HTN    #Hypovolemic shock - resolved   #LGIB  #DIverticulosis   #HO diverticultis s/p colectomy   - s/p 4 units prbc 1 unit ffp   - 11/5 IR angiography - DALJIT and selective sigmoid angiography withoutevidence of active contrast extravasation. The sigmoid arcade vessels were very small caliber due to vasoconstriction. The patient remained hemodynamically stable throughout the procedure. Findings discussed with gastroenterology.  - 11/5 -Colonoscopy findings: Severe diverticulosis throughout the colon. Blood clots and stools limited the visualization but no evidence of active bleeding was noted.   - now HD stable, with hgb stable   - monitor CBC q12hr   - keep hgb >8   - maintain active type and screen   - advanced to full liquid diet   - pt refusing colonscopy at [facility] would like to be transferred to [place],    - pantoprazole 40mg IV BID   - abx for possible diverticulitis - augmentin 1 tab tid       #HTN  home regimen: amlodipine 5mg qd/ lisinopril 40mg qd/lasix 20mg qd   - monitor off meds     #HLD   - continue simvastatin 20mg qd     #depression  - continue sertraline 100mg qd     DVT ppx- none for now   GI ppx - pantoprazole 40mg iv bid   Diet clear liquid   Code full    Assessment   60 yo M PMHx of diverticulitis s/p partial colon resection 2019 at [place], HTN, DLD transferred from [facility] due to BRBPR x1d.   Per wife at bedside, states pt began to feel LLQ pain one week ago, contacted Dr Nava who prescribed a course of two antibiotics (does not recall names), pt completed abx course with no improvement in pain. Yesterday experienced BRBPR x4, went to [facility], CT A/P without abnormalities and stable Hb so was d/c home. BRBPR continued, pt was brought back [place], given prbc x2 and transferred to [place]. Hypotensive on admission, 60/40, code fusion called for initiation of MTP. Pt is s/p 2 additional units prbcs, platelet x1 and calcium gluconate x1, 2L LR bolus. BP improved to MAP 67 and pt did not need pressor support.   CTA A/P showing active intraluminal contrast extravasation in the distal descending colon/proximal sigmoid, compatible with active intestinal hemorrhage. IR c/s for embolization.   ED VS, T(F) Max: 98.5 HR: 63 BP: 89/53 MAP 66 RR: 18 SpO2: 99% in RA. Labs notable for Hb 12.4 <- 14.6. CXR unremarkable.   Admitted for hypovolemic shock due to ongoing intestinal hemorrhage.     Hypovolemic shock / Hemorrhagic shock   LGIB   HO Diverticulitis SP partial large bowel resection    HO HTN    #Hypovolemic shock - resolved   #LGIB  #DIverticulosis   #HO diverticultis s/p colectomy   # Diveriticulitis  - s/p 4 units prbc 1 unit ffp   - 11/5 IR angiography - DALJIT and selective sigmoid angiography withoutevidence of active contrast extravasation. The sigmoid arcade vessels were very small caliber due to vasoconstriction. The patient remained hemodynamically stable throughout the procedure. Findings discussed with gastroenterology.  - 11/5 -Colonoscopy findings: Severe diverticulosis throughout the colon. Blood clots and stools limited the visualization but no evidence of active bleeding was noted.   - now HD stable, with hgb stable   - monitor CBC q12hr   - keep hgb >8   - maintain active type and screen   - advanced to full liquid diet   - pt refusing colonscopy at [facility] would like to be transferred to [place],    - pantoprazole 40mg IV BID   - abx for possible diverticulitis - augmentin 1 tab tid       #HTN  home regimen: amlodipine 5mg qd/ lisinopril 40mg qd/lasix 20mg qd   - monitor off meds     #HLD   - continue simvastatin 20mg qd     #depression  - continue sertraline 100mg qd     DVT ppx- none for now   GI ppx - pantoprazole 40mg iv bid   Diet clear liquid   Code full

## 2021-11-11 NOTE — PROGRESS NOTE ADULT - REASON FOR ADMISSION
Rectal bleed

## 2021-11-11 NOTE — DISCHARGE NOTE NURSING/CASE MANAGEMENT/SOCIAL WORK - PATIENT PORTAL LINK FT
You can access the FollowMyHealth Patient Portal offered by [facility] by registering at the following website: [email or web address]. By joining [employer]’s FollowMyHealth portal, you will also be able to view your health information using other applications (apps) compatible with our system.

## 2021-11-11 NOTE — DISCHARGE NOTE NURSING/CASE MANAGEMENT/SOCIAL WORK - NSDCFUADDAPPT_GEN_ALL_CORE_FT
your blood pressure medications have been held, in the setting of lower blood pressure while you had GI bleeding,     talk to your PCP about restarting them if needed.    67

## 2021-11-11 NOTE — DISCHARGE NOTE NURSING/CASE MANAGEMENT/SOCIAL WORK - NSDCPEWEB_GEN_ALL_CORE
[facility] for Tobacco Control website --- [email or web address] website --- [email or web address]

## 2021-11-11 NOTE — DISCHARGE NOTE NURSING/CASE MANAGEMENT/SOCIAL WORK - NSDCPEEMAIL_GEN_ALL_CORE
[facility] for Tobacco Control email [email or web address]

## 2021-11-11 NOTE — PROGRESS NOTE ADULT - SUBJECTIVE AND OBJECTIVE BOX
KRISTINA MCKEON 61y Male  MRN#: [id number]   CODE STATUS: full code     Hospital Day: 6d    Pt is currently admitted with the primary diagnosis of diverticular bleed     SUBJECTIVE    overnight was concerned abt bloody bowel movement JAZMIN performed negative, hgb stable     no other events denies fever chills, nvd, abdominal pain improved                                               ----------------------------------------------------------  OBJECTIVE  PAST MEDICAL & SURGICAL HISTORY  Diverticulitis    HTN (hypertension)    HLD (hyperlipidemia)    H/O left knee surgery    S/P partial colectomy  2019                                              -----------------------------------------------------------  ALLERGIES:  No Known Allergies                                            ------------------------------------------------------------    HOME MEDICATIONS  Home Medications:  amLODIPine 10 mg oral tablet: 1 tab(s) orally once a day (05 Nov 2021 05:04)  Bystolic 5 mg oral tablet: 1 tab(s) orally once a day (16 Sep 2019 17:23)  furosemide 20 mg oral tablet: 1 tab(s) orally once a day (16 Sep 2019 17:23)  lisinopril 40 mg oral tablet: 1 tab(s) orally once a day (16 Sep 2019 17:23)  sertraline 100 mg oral tablet: 1 tab(s) orally once a day (16 Sep 2019 17:23)  simvastatin 20 mg oral tablet: 1 tab(s) orally once a day (at bedtime) (16 Sep 2019 17:23)                           MEDICATIONS:  STANDING MEDICATIONS  amoxicillin  875 milliGRAM(s)/clavulanate 1 Tablet(s) Oral every 8 hours  chlorhexidine 4% Liquid 1 Application(s) Topical <User Schedule>  influenza   Vaccine 0.5 milliLiter(s) IntraMuscular once  melatonin 3 milliGRAM(s) Oral at bedtime  pantoprazole  Injectable 40 milliGRAM(s) IV Push every 12 hours  senna 2 Tablet(s) Oral daily  sertraline 100 milliGRAM(s) Oral daily  simvastatin 20 milliGRAM(s) Oral at bedtime    PRN MEDICATIONS  acetaminophen     Tablet .. 650 milliGRAM(s) Oral every 6 hours PRN  oxycodone    5 mG/acetaminophen 325 mG 2 Tablet(s) Oral every 6 hours PRN                                            ------------------------------------------------------------  VITAL SIGNS: Last 24 Hours  T(C): 35.6 (11 Nov 2021 07:56), Max: 36.7 (10 Nov 2021 15:57)  T(F): 96 (11 Nov 2021 07:56), Max: 98 (10 Nov 2021 15:57)  HR: 60 (11 Nov 2021 07:56) (60 - 87)  BP: 137/83 (11 Nov 2021 07:56) (121/62 - 159/91)  BP(mean): --  RR: 18 (11 Nov 2021 07:56) (18 - 19)  SpO2: --                                             --------------------------------------------------------------  LABS:                        10.0   8.30  )-----------( 225      ( 11 Nov 2021 02:53 )             29.7     11-10    142  |  103  |  6<L>  ----------------------------<  110<H>  3.6   |  23  |  0.9    Ca    8.8      10 Nov 2021 08:15  Mg     1.8     11-10    TPro  5.9<L>  /  Alb  3.9  /  TBili  1.0  /  DBili  x   /  AST  16  /  ALT  17  /  AlkPhos  75  11-10      PHYSICAL EXAM:  GENERAL:  comfortable, in NAD   HEENT: NCAT  LUNGS: air entry bilaterally   HEART: RRR, +S1,S2, RRR  ABDOMEN: moderate tenderness llq, no rebound guarding   EXT: Warm, well perfused x 4  NEURO: AxOx3, No FND's noted  SKIN: No new breakdown or rashes noted                                          --------------------------------------------------------------

## 2021-11-15 DIAGNOSIS — F32.A DEPRESSION, UNSPECIFIED: ICD-10-CM

## 2021-11-15 DIAGNOSIS — E78.5 HYPERLIPIDEMIA, UNSPECIFIED: ICD-10-CM

## 2021-11-15 DIAGNOSIS — D62 ACUTE POSTHEMORRHAGIC ANEMIA: ICD-10-CM

## 2021-11-15 DIAGNOSIS — I10 ESSENTIAL (PRIMARY) HYPERTENSION: ICD-10-CM

## 2021-11-15 DIAGNOSIS — R57.1 HYPOVOLEMIC SHOCK: ICD-10-CM

## 2021-11-15 DIAGNOSIS — G47.33 OBSTRUCTIVE SLEEP APNEA (ADULT) (PEDIATRIC): ICD-10-CM

## 2021-11-15 DIAGNOSIS — R57.8 OTHER SHOCK: ICD-10-CM

## 2021-11-15 DIAGNOSIS — K57.31 DIVERTICULOSIS OF LARGE INTESTINE WITHOUT PERFORATION OR ABSCESS WITH BLEEDING: ICD-10-CM

## 2021-11-15 DIAGNOSIS — D17.79 BENIGN LIPOMATOUS NEOPLASM OF OTHER SITES: ICD-10-CM

## 2021-11-15 DIAGNOSIS — Z90.49 ACQUIRED ABSENCE OF OTHER SPECIFIED PARTS OF DIGESTIVE TRACT: ICD-10-CM

## 2021-11-15 DIAGNOSIS — K57.93 DIVERTICULITIS OF INTESTINE, PART UNSPECIFIED, WITHOUT PERFORATION OR ABSCESS WITH BLEEDING: ICD-10-CM

## 2021-11-15 DIAGNOSIS — K62.5 HEMORRHAGE OF ANUS AND RECTUM: ICD-10-CM

## 2023-02-08 NOTE — ED PROVIDER NOTE - IV ALTEPLASE EXCL ABS HIDDEN
What Is The Reason For Today's Visit?: Full Body Skin Examination What Is The Reason For Today's Visit? (Being Monitored For X): the development of new lesions show

## 2023-02-28 PROBLEM — Z00.00 ENCOUNTER FOR PREVENTIVE HEALTH EXAMINATION: Status: ACTIVE | Noted: 2023-02-28

## 2023-03-02 ENCOUNTER — OUTPATIENT (OUTPATIENT)
Dept: OUTPATIENT SERVICES | Facility: HOSPITAL | Age: 63
LOS: 1 days | End: 2023-03-02
Payer: COMMERCIAL

## 2023-03-02 DIAGNOSIS — Z00.8 ENCOUNTER FOR OTHER GENERAL EXAMINATION: ICD-10-CM

## 2023-03-02 DIAGNOSIS — Z90.49 ACQUIRED ABSENCE OF OTHER SPECIFIED PARTS OF DIGESTIVE TRACT: Chronic | ICD-10-CM

## 2023-03-02 DIAGNOSIS — Z98.890 OTHER SPECIFIED POSTPROCEDURAL STATES: Chronic | ICD-10-CM

## 2023-03-02 DIAGNOSIS — I25.10 ATHEROSCLEROTIC HEART DISEASE OF NATIVE CORONARY ARTERY WITHOUT ANGINA PECTORIS: ICD-10-CM

## 2023-03-02 PROCEDURE — 75574 CT ANGIO HRT W/3D IMAGE: CPT

## 2023-03-02 PROCEDURE — 75574 CT ANGIO HRT W/3D IMAGE: CPT | Mod: 26

## 2023-03-03 DIAGNOSIS — I25.10 ATHEROSCLEROTIC HEART DISEASE OF NATIVE CORONARY ARTERY WITHOUT ANGINA PECTORIS: ICD-10-CM

## 2023-08-21 ENCOUNTER — EMERGENCY (EMERGENCY)
Facility: HOSPITAL | Age: 63
LOS: 0 days | Discharge: HOME | End: 2023-08-22
Attending: EMERGENCY MEDICINE
Payer: COMMERCIAL

## 2023-08-21 VITALS
HEIGHT: 74 IN | HEART RATE: 69 BPM | WEIGHT: 285.06 LBS | OXYGEN SATURATION: 99 % | TEMPERATURE: 98 F | RESPIRATION RATE: 18 BRPM | SYSTOLIC BLOOD PRESSURE: 166 MMHG | DIASTOLIC BLOOD PRESSURE: 97 MMHG

## 2023-08-21 DIAGNOSIS — R11.0 NAUSEA: ICD-10-CM

## 2023-08-21 DIAGNOSIS — Z98.890 OTHER SPECIFIED POSTPROCEDURAL STATES: Chronic | ICD-10-CM

## 2023-08-21 DIAGNOSIS — R10.32 LEFT LOWER QUADRANT PAIN: ICD-10-CM

## 2023-08-21 DIAGNOSIS — Z90.49 ACQUIRED ABSENCE OF OTHER SPECIFIED PARTS OF DIGESTIVE TRACT: Chronic | ICD-10-CM

## 2023-08-21 DIAGNOSIS — Z90.49 ACQUIRED ABSENCE OF OTHER SPECIFIED PARTS OF DIGESTIVE TRACT: ICD-10-CM

## 2023-08-21 DIAGNOSIS — E78.5 HYPERLIPIDEMIA, UNSPECIFIED: ICD-10-CM

## 2023-08-21 DIAGNOSIS — I10 ESSENTIAL (PRIMARY) HYPERTENSION: ICD-10-CM

## 2023-08-21 DIAGNOSIS — Z87.19 PERSONAL HISTORY OF OTHER DISEASES OF THE DIGESTIVE SYSTEM: ICD-10-CM

## 2023-08-21 LAB
ALBUMIN SERPL ELPH-MCNC: 4.1 G/DL — SIGNIFICANT CHANGE UP (ref 3.5–5.2)
ALP SERPL-CCNC: 79 U/L — SIGNIFICANT CHANGE UP (ref 30–115)
ALT FLD-CCNC: 18 U/L — SIGNIFICANT CHANGE UP (ref 0–41)
ANION GAP SERPL CALC-SCNC: 12 MMOL/L — SIGNIFICANT CHANGE UP (ref 7–14)
AST SERPL-CCNC: 17 U/L — SIGNIFICANT CHANGE UP (ref 0–41)
BASOPHILS # BLD AUTO: 0.05 K/UL — SIGNIFICANT CHANGE UP (ref 0–0.2)
BASOPHILS NFR BLD AUTO: 0.6 % — SIGNIFICANT CHANGE UP (ref 0–1)
BILIRUB DIRECT SERPL-MCNC: <0.2 MG/DL — SIGNIFICANT CHANGE UP (ref 0–0.3)
BILIRUB INDIRECT FLD-MCNC: >0.4 MG/DL — SIGNIFICANT CHANGE UP (ref 0.2–1.2)
BILIRUB SERPL-MCNC: 0.6 MG/DL — SIGNIFICANT CHANGE UP (ref 0.2–1.2)
BUN SERPL-MCNC: 17 MG/DL — SIGNIFICANT CHANGE UP (ref 10–20)
CALCIUM SERPL-MCNC: 9.3 MG/DL — SIGNIFICANT CHANGE UP (ref 8.4–10.5)
CHLORIDE SERPL-SCNC: 105 MMOL/L — SIGNIFICANT CHANGE UP (ref 98–110)
CO2 SERPL-SCNC: 25 MMOL/L — SIGNIFICANT CHANGE UP (ref 17–32)
CREAT SERPL-MCNC: 1.1 MG/DL — SIGNIFICANT CHANGE UP (ref 0.7–1.5)
EGFR: 75 ML/MIN/1.73M2 — SIGNIFICANT CHANGE UP
EOSINOPHIL # BLD AUTO: 0.16 K/UL — SIGNIFICANT CHANGE UP (ref 0–0.7)
EOSINOPHIL NFR BLD AUTO: 2 % — SIGNIFICANT CHANGE UP (ref 0–8)
GLUCOSE SERPL-MCNC: 113 MG/DL — HIGH (ref 70–99)
HCT VFR BLD CALC: 43.5 % — SIGNIFICANT CHANGE UP (ref 42–52)
HGB BLD-MCNC: 14.7 G/DL — SIGNIFICANT CHANGE UP (ref 14–18)
IMM GRANULOCYTES NFR BLD AUTO: 0.2 % — SIGNIFICANT CHANGE UP (ref 0.1–0.3)
LACTATE SERPL-SCNC: 1.7 MMOL/L — SIGNIFICANT CHANGE UP (ref 0.7–2)
LIDOCAIN IGE QN: 35 U/L — SIGNIFICANT CHANGE UP (ref 7–60)
LYMPHOCYTES # BLD AUTO: 2.23 K/UL — SIGNIFICANT CHANGE UP (ref 1.2–3.4)
LYMPHOCYTES # BLD AUTO: 27.3 % — SIGNIFICANT CHANGE UP (ref 20.5–51.1)
MCHC RBC-ENTMCNC: 30.4 PG — SIGNIFICANT CHANGE UP (ref 27–31)
MCHC RBC-ENTMCNC: 33.8 G/DL — SIGNIFICANT CHANGE UP (ref 32–37)
MCV RBC AUTO: 89.9 FL — SIGNIFICANT CHANGE UP (ref 80–94)
MONOCYTES # BLD AUTO: 0.56 K/UL — SIGNIFICANT CHANGE UP (ref 0.1–0.6)
MONOCYTES NFR BLD AUTO: 6.9 % — SIGNIFICANT CHANGE UP (ref 1.7–9.3)
NEUTROPHILS # BLD AUTO: 5.14 K/UL — SIGNIFICANT CHANGE UP (ref 1.4–6.5)
NEUTROPHILS NFR BLD AUTO: 63 % — SIGNIFICANT CHANGE UP (ref 42.2–75.2)
NRBC # BLD: 0 /100 WBCS — SIGNIFICANT CHANGE UP (ref 0–0)
PLATELET # BLD AUTO: 199 K/UL — SIGNIFICANT CHANGE UP (ref 130–400)
PMV BLD: 11 FL — HIGH (ref 7.4–10.4)
POTASSIUM SERPL-MCNC: 3.6 MMOL/L — SIGNIFICANT CHANGE UP (ref 3.5–5)
POTASSIUM SERPL-SCNC: 3.6 MMOL/L — SIGNIFICANT CHANGE UP (ref 3.5–5)
PROT SERPL-MCNC: 6.3 G/DL — SIGNIFICANT CHANGE UP (ref 6–8)
RBC # BLD: 4.84 M/UL — SIGNIFICANT CHANGE UP (ref 4.7–6.1)
RBC # FLD: 12.4 % — SIGNIFICANT CHANGE UP (ref 11.5–14.5)
SODIUM SERPL-SCNC: 142 MMOL/L — SIGNIFICANT CHANGE UP (ref 135–146)
WBC # BLD: 8.16 K/UL — SIGNIFICANT CHANGE UP (ref 4.8–10.8)
WBC # FLD AUTO: 8.16 K/UL — SIGNIFICANT CHANGE UP (ref 4.8–10.8)

## 2023-08-21 PROCEDURE — 80076 HEPATIC FUNCTION PANEL: CPT

## 2023-08-21 PROCEDURE — 85025 COMPLETE CBC W/AUTO DIFF WBC: CPT

## 2023-08-21 PROCEDURE — 96374 THER/PROPH/DIAG INJ IV PUSH: CPT | Mod: XU

## 2023-08-21 PROCEDURE — 96375 TX/PRO/DX INJ NEW DRUG ADDON: CPT

## 2023-08-21 PROCEDURE — 83690 ASSAY OF LIPASE: CPT

## 2023-08-21 PROCEDURE — 74177 CT ABD & PELVIS W/CONTRAST: CPT | Mod: MA

## 2023-08-21 PROCEDURE — 99285 EMERGENCY DEPT VISIT HI MDM: CPT

## 2023-08-21 PROCEDURE — 80048 BASIC METABOLIC PNL TOTAL CA: CPT

## 2023-08-21 PROCEDURE — 81003 URINALYSIS AUTO W/O SCOPE: CPT

## 2023-08-21 PROCEDURE — 83605 ASSAY OF LACTIC ACID: CPT

## 2023-08-21 PROCEDURE — 99284 EMERGENCY DEPT VISIT MOD MDM: CPT | Mod: 25

## 2023-08-21 PROCEDURE — 74177 CT ABD & PELVIS W/CONTRAST: CPT | Mod: 26,MA

## 2023-08-21 RX ORDER — HYDROMORPHONE HYDROCHLORIDE 2 MG/ML
1 INJECTION INTRAMUSCULAR; INTRAVENOUS; SUBCUTANEOUS ONCE
Refills: 0 | Status: DISCONTINUED | OUTPATIENT
Start: 2023-08-21 | End: 2023-08-21

## 2023-08-21 RX ORDER — MORPHINE SULFATE 50 MG/1
6 CAPSULE, EXTENDED RELEASE ORAL ONCE
Refills: 0 | Status: DISCONTINUED | OUTPATIENT
Start: 2023-08-21 | End: 2023-08-21

## 2023-08-21 RX ORDER — IOHEXOL 300 MG/ML
30 INJECTION, SOLUTION INTRAVENOUS ONCE
Refills: 0 | Status: COMPLETED | OUTPATIENT
Start: 2023-08-21 | End: 2023-08-21

## 2023-08-21 RX ORDER — SODIUM CHLORIDE 9 MG/ML
1000 INJECTION INTRAMUSCULAR; INTRAVENOUS; SUBCUTANEOUS ONCE
Refills: 0 | Status: COMPLETED | OUTPATIENT
Start: 2023-08-21 | End: 2023-08-21

## 2023-08-21 RX ORDER — ONDANSETRON 8 MG/1
4 TABLET, FILM COATED ORAL ONCE
Refills: 0 | Status: COMPLETED | OUTPATIENT
Start: 2023-08-21 | End: 2023-08-21

## 2023-08-21 RX ADMIN — IOHEXOL 30 MILLILITER(S): 300 INJECTION, SOLUTION INTRAVENOUS at 21:18

## 2023-08-21 RX ADMIN — MORPHINE SULFATE 6 MILLIGRAM(S): 50 CAPSULE, EXTENDED RELEASE ORAL at 21:07

## 2023-08-21 RX ADMIN — SODIUM CHLORIDE 1000 MILLILITER(S): 9 INJECTION INTRAMUSCULAR; INTRAVENOUS; SUBCUTANEOUS at 21:07

## 2023-08-21 RX ADMIN — MORPHINE SULFATE 6 MILLIGRAM(S): 50 CAPSULE, EXTENDED RELEASE ORAL at 22:07

## 2023-08-21 RX ADMIN — HYDROMORPHONE HYDROCHLORIDE 1 MILLIGRAM(S): 2 INJECTION INTRAMUSCULAR; INTRAVENOUS; SUBCUTANEOUS at 22:07

## 2023-08-21 RX ADMIN — ONDANSETRON 4 MILLIGRAM(S): 8 TABLET, FILM COATED ORAL at 21:07

## 2023-08-21 NOTE — ED ADULT TRIAGE NOTE - CHIEF COMPLAINT QUOTE
pt complaining of LLQ abdominal pain  for 1 week, worsening last couple of days. hx of diverticulitis and GI bleed

## 2023-08-21 NOTE — ED ADULT NURSE NOTE - OBJECTIVE STATEMENT
pt complaining of left lower quadrant pain radiating to the back  pt stated he has history of diverticulitis, feels the pain is similar   pt took tylenol this morning with no relief of symptoms  pt denies fevers or vomiting

## 2023-08-22 LAB
APPEARANCE UR: CLEAR — SIGNIFICANT CHANGE UP
BILIRUB UR-MCNC: NEGATIVE — SIGNIFICANT CHANGE UP
COLOR SPEC: YELLOW — SIGNIFICANT CHANGE UP
DIFF PNL FLD: NEGATIVE — SIGNIFICANT CHANGE UP
GLUCOSE UR QL: NEGATIVE MG/DL — SIGNIFICANT CHANGE UP
KETONES UR-MCNC: NEGATIVE MG/DL — SIGNIFICANT CHANGE UP
LEUKOCYTE ESTERASE UR-ACNC: NEGATIVE — SIGNIFICANT CHANGE UP
NITRITE UR-MCNC: NEGATIVE — SIGNIFICANT CHANGE UP
PH UR: 6 — SIGNIFICANT CHANGE UP (ref 5–8)
PROT UR-MCNC: NEGATIVE MG/DL — SIGNIFICANT CHANGE UP
SP GR SPEC: >1.03 — HIGH (ref 1–1.03)
UROBILINOGEN FLD QL: 0.2 MG/DL — SIGNIFICANT CHANGE UP (ref 0.2–1)

## 2023-08-22 RX ORDER — KETOROLAC TROMETHAMINE 30 MG/ML
30 SYRINGE (ML) INJECTION ONCE
Refills: 0 | Status: DISCONTINUED | OUTPATIENT
Start: 2023-08-22 | End: 2023-08-22

## 2023-08-22 RX ADMIN — Medication 30 MILLIGRAM(S): at 00:50

## 2023-08-22 NOTE — ED PROVIDER NOTE - NSFOLLOWUPINSTRUCTIONS_ED_ALL_ED_FT
Follow up with your primary care doctor and your GI doctor in 1-2 days       Acute Abdominal Pain    WHAT YOU NEED TO KNOW:    The cause of your abdominal pain may not be found. If a cause is found, treatment will depend on what the cause is.     DISCHARGE INSTRUCTIONS:    Return to the emergency department if:     You vomit blood or cannot stop vomiting.      You have blood in your bowel movement or it looks like tar.       You have bleeding from your rectum.       Your abdomen is larger than usual, more painful, and hard.       You have severe pain in your abdomen.       You stop passing gas and having bowel movements.       You feel weak, dizzy, or faint.    Contact your healthcare provider if:     You have a fever.      You have new signs and symptoms.      Your symptoms do not get better with treatment.       You have questions or concerns about your condition or care.    Medicines may be given to decrease pain, treat an infection, and manage your symptoms. Take your medicine as directed. Call your healthcare provider if you think your medicine is not helping or if you have side effects. Tell him if you are allergic to any medicine. Keep a list of the medicines, vitamins, and herbs you take. Include the amounts, and when and why you take them. Bring the list or the pill bottles to follow-up visits. Carry your medicine list with you in case of an emergency.    Manage your symptoms:     Apply heat on your abdomen for 20 to 30 minutes every 2 hours for as many days as directed. Heat helps decrease pain and muscle spasms.       Manage your stress. Stress may cause abdominal pain. Your healthcare provider may recommend relaxation techniques and deep breathing exercises to help decrease your stress. Your healthcare provider may recommend you talk to someone about your stress or anxiety, such as a counselor or a trusted friend. Get plenty of sleep and exercise regularly.       Limit or do not drink alcohol. Alcohol can make your abdominal pain worse. Ask your healthcare provider if it is safe for you to drink alcohol. Also ask how much is safe for you to drink.       Do not smoke. Nicotine and other chemicals in cigarettes can damage your esophagus and stomach. Ask your healthcare provider for information if you currently smoke and need help to quit. E-cigarettes or smokeless tobacco still contain nicotine. Talk to your healthcare provider before you use these products.     Make changes to the food you eat as directed: Do not eat foods that cause abdominal pain or other symptoms. Eat small meals more often.     Eat more high-fiber foods if you are constipated. High-fiber foods include fruits, vegetables, whole-grain foods, and legumes.       Do not eat foods that cause gas if you have bloating. Examples include broccoli, cabbage, and cauliflower. Do not drink soda or carbonated drinks, because these may also cause gas.       Do not eat foods or drinks that contain sorbitol or fructose if you have diarrhea and bloating. Some examples are fruit juices, candy, jelly, and sugar-free gum.       Do not eat high-fat foods, such as fried foods, cheeseburgers, hot dogs, and desserts.      Limit or do not drink caffeine. Caffeine may make symptoms, such as heart burn or nausea, worse.       Drink plenty of liquids to prevent dehydration from diarrhea or vomiting. Ask your healthcare provider how much liquid to drink each day and which liquids are best for you.     Follow up with your healthcare provider as directed: Write down your questions so you remember to ask them during your visits.       © Copyright [employer] 2019 All illustrations and images included in CareNotes are the copyrighted property of [employer] or [employer]

## 2023-08-22 NOTE — ED PROVIDER NOTE - ATTENDING APP SHARED VISIT CONTRIBUTION OF CARE
CATARACTS, OU: VISUALLY SIGNIFICANT. SURGERY INDICATED. PATIENT WISHES TO WAIT AT THIS TIME. GLASSES PRESCRIPTION GIVEN. PATIENT TO CALL BACK IF THEY DECIDE TO PROCEED WITH SURGERY WITHIN 6 MONTHS. OTHERWISE, FOLLOW AS SCHEDULED. I reviewed and verified the documentation and  and independently performed the documented  MDM.

## 2023-08-22 NOTE — ED PROVIDER NOTE - PHYSICAL EXAMINATION
Physical Exam    Vital Signs: I have reviewed the initial vital signs.  Constitutional: well-nourished, appears stated age, no acute distress  Eyes: Conjunctiva pink, Sclera clear, PERRLA, EOMI.  Cardiovascular: S1 and S2, regular rate, regular rhythm, well-perfused extremities, radial pulses equal and 2+  Respiratory: unlabored respiratory effort, clear to auscultation bilaterally no wheezing, rales and rhonchi  Gastrointestinal: soft, LLQ tenderness, no pulsatile mass, normal bowl sounds  Musculoskeletal: supple neck, no lower extremity edema, no midline tenderness  Integumentary: warm, dry, no rash  Neurologic: awake, alert, cranial nerves II-XII grossly intact, extremities’ motor and sensory functions grossly intact  Psychiatric: appropriate mood, appropriate affect

## 2023-08-22 NOTE — ED PROVIDER NOTE - OBJECTIVE STATEMENT
63 year old male past medical history of diverticulitis and GI bleed in the past, colon resection comes to emergency room for increasing pain in LLQ for the last day. no fever/chills. + nausea no vomiting.

## 2023-08-22 NOTE — ED PROVIDER NOTE - CLINICAL SUMMARY MEDICAL DECISION MAKING FREE TEXT BOX
63yM History of colon resection due to diverticulitis 5 years ago, GI bleed, presents with left lower quadrant pain x1 month worse over the past 1 week yesterday moved to low back associate with nausea.  Labs reviewed serum WBC 8 creatinine 1.1 LFTs normal urine no blood no WBCs CT abdomen pelvis p.o. IV contrast  No CT evidence of acute abdominopelvic pathology.  	2.  Cholelithiasis.    Patient to be discharged from ED in well appearing condition. Any available test results were discussed with and printed  for patient.  Verbal instructions given, including instructions to return to ED immediately for any new, worsening, or concerning symptoms. Limitations of ED work up discussed.  Patient reports understanding of above with capacity and insight. Written discharge instructions additionally given, including follow-up plan.

## 2023-08-22 NOTE — ED PROVIDER NOTE - PATIENT PORTAL LINK FT
You can access the FollowMyHealth Patient Portal offered by [facility] by registering at the following website: [email or web address]. By joining [employer]’s FollowMyHealth portal, you will also be able to view your health information using other applications (apps) compatible with our system.

## 2024-03-13 ENCOUNTER — OUTPATIENT (OUTPATIENT)
Dept: OUTPATIENT SERVICES | Facility: HOSPITAL | Age: 64
LOS: 1 days | End: 2024-03-13
Payer: COMMERCIAL

## 2024-03-13 DIAGNOSIS — Z98.890 OTHER SPECIFIED POSTPROCEDURAL STATES: Chronic | ICD-10-CM

## 2024-03-13 DIAGNOSIS — Z00.8 ENCOUNTER FOR OTHER GENERAL EXAMINATION: ICD-10-CM

## 2024-03-13 DIAGNOSIS — R22.2 LOCALIZED SWELLING, MASS AND LUMP, TRUNK: ICD-10-CM

## 2024-03-13 DIAGNOSIS — Z90.49 ACQUIRED ABSENCE OF OTHER SPECIFIED PARTS OF DIGESTIVE TRACT: Chronic | ICD-10-CM

## 2024-03-13 PROCEDURE — 71250 CT THORAX DX C-: CPT

## 2024-03-13 PROCEDURE — 71250 CT THORAX DX C-: CPT | Mod: 26

## 2024-03-14 DIAGNOSIS — R22.2 LOCALIZED SWELLING, MASS AND LUMP, TRUNK: ICD-10-CM

## 2024-05-23 NOTE — CONSULT NOTE ADULT - CONSULT REASON
See telephone msg from 5/17/24. Patient informed will only get approved MRI.   
rectal bleed
LGIB, angio/embo

## 2024-07-15 ENCOUNTER — EMERGENCY (EMERGENCY)
Facility: HOSPITAL | Age: 64
LOS: 0 days | Discharge: HOME | End: 2024-07-15
Attending: EMERGENCY MEDICINE
Payer: COMMERCIAL

## 2024-07-15 VITALS
TEMPERATURE: 98 F | OXYGEN SATURATION: 100 % | SYSTOLIC BLOOD PRESSURE: 145 MMHG | RESPIRATION RATE: 18 BRPM | HEART RATE: 70 BPM | DIASTOLIC BLOOD PRESSURE: 90 MMHG

## 2024-07-15 VITALS
RESPIRATION RATE: 18 BRPM | TEMPERATURE: 98 F | SYSTOLIC BLOOD PRESSURE: 166 MMHG | DIASTOLIC BLOOD PRESSURE: 105 MMHG | HEART RATE: 73 BPM | WEIGHT: 304.9 LBS

## 2024-07-15 DIAGNOSIS — Z90.49 ACQUIRED ABSENCE OF OTHER SPECIFIED PARTS OF DIGESTIVE TRACT: Chronic | ICD-10-CM

## 2024-07-15 DIAGNOSIS — Z87.19 PERSONAL HISTORY OF OTHER DISEASES OF THE DIGESTIVE SYSTEM: ICD-10-CM

## 2024-07-15 DIAGNOSIS — R10.32 LEFT LOWER QUADRANT PAIN: ICD-10-CM

## 2024-07-15 DIAGNOSIS — I10 ESSENTIAL (PRIMARY) HYPERTENSION: ICD-10-CM

## 2024-07-15 DIAGNOSIS — Z98.890 OTHER SPECIFIED POSTPROCEDURAL STATES: Chronic | ICD-10-CM

## 2024-07-15 DIAGNOSIS — N43.3 HYDROCELE, UNSPECIFIED: ICD-10-CM

## 2024-07-15 DIAGNOSIS — R19.7 DIARRHEA, UNSPECIFIED: ICD-10-CM

## 2024-07-15 DIAGNOSIS — R11.0 NAUSEA: ICD-10-CM

## 2024-07-15 DIAGNOSIS — E78.5 HYPERLIPIDEMIA, UNSPECIFIED: ICD-10-CM

## 2024-07-15 LAB
ALBUMIN SERPL ELPH-MCNC: 3.9 G/DL — SIGNIFICANT CHANGE UP (ref 3.5–5.2)
ALP SERPL-CCNC: 77 U/L — SIGNIFICANT CHANGE UP (ref 30–115)
ALT FLD-CCNC: 18 U/L — SIGNIFICANT CHANGE UP (ref 0–41)
ANION GAP SERPL CALC-SCNC: 13 MMOL/L — SIGNIFICANT CHANGE UP (ref 7–14)
AST SERPL-CCNC: 18 U/L — SIGNIFICANT CHANGE UP (ref 0–41)
BASOPHILS # BLD AUTO: 0.05 K/UL — SIGNIFICANT CHANGE UP (ref 0–0.2)
BASOPHILS NFR BLD AUTO: 0.7 % — SIGNIFICANT CHANGE UP (ref 0–1)
BILIRUB SERPL-MCNC: 1.4 MG/DL — HIGH (ref 0.2–1.2)
BUN SERPL-MCNC: 14 MG/DL — SIGNIFICANT CHANGE UP (ref 10–20)
CALCIUM SERPL-MCNC: 9.3 MG/DL — SIGNIFICANT CHANGE UP (ref 8.4–10.5)
CHLORIDE SERPL-SCNC: 102 MMOL/L — SIGNIFICANT CHANGE UP (ref 98–110)
CO2 SERPL-SCNC: 22 MMOL/L — SIGNIFICANT CHANGE UP (ref 17–32)
CREAT SERPL-MCNC: 1.1 MG/DL — SIGNIFICANT CHANGE UP (ref 0.7–1.5)
EGFR: 75 ML/MIN/1.73M2 — SIGNIFICANT CHANGE UP
EOSINOPHIL # BLD AUTO: 0.09 K/UL — SIGNIFICANT CHANGE UP (ref 0–0.7)
EOSINOPHIL NFR BLD AUTO: 1.3 % — SIGNIFICANT CHANGE UP (ref 0–8)
GLUCOSE SERPL-MCNC: 104 MG/DL — HIGH (ref 70–99)
HCT VFR BLD CALC: 44.6 % — SIGNIFICANT CHANGE UP (ref 42–52)
HGB BLD-MCNC: 15 G/DL — SIGNIFICANT CHANGE UP (ref 14–18)
IMM GRANULOCYTES NFR BLD AUTO: 0.3 % — SIGNIFICANT CHANGE UP (ref 0.1–0.3)
LIDOCAIN IGE QN: 37 U/L — SIGNIFICANT CHANGE UP (ref 7–60)
LYMPHOCYTES # BLD AUTO: 1.83 K/UL — SIGNIFICANT CHANGE UP (ref 1.2–3.4)
LYMPHOCYTES # BLD AUTO: 26.4 % — SIGNIFICANT CHANGE UP (ref 20.5–51.1)
MCHC RBC-ENTMCNC: 30.5 PG — SIGNIFICANT CHANGE UP (ref 27–31)
MCHC RBC-ENTMCNC: 33.6 G/DL — SIGNIFICANT CHANGE UP (ref 32–37)
MCV RBC AUTO: 90.8 FL — SIGNIFICANT CHANGE UP (ref 80–94)
MONOCYTES # BLD AUTO: 0.49 K/UL — SIGNIFICANT CHANGE UP (ref 0.1–0.6)
MONOCYTES NFR BLD AUTO: 7.1 % — SIGNIFICANT CHANGE UP (ref 1.7–9.3)
NEUTROPHILS # BLD AUTO: 4.44 K/UL — SIGNIFICANT CHANGE UP (ref 1.4–6.5)
NEUTROPHILS NFR BLD AUTO: 64.2 % — SIGNIFICANT CHANGE UP (ref 42.2–75.2)
NRBC # BLD: 0 /100 WBCS — SIGNIFICANT CHANGE UP (ref 0–0)
PLATELET # BLD AUTO: 212 K/UL — SIGNIFICANT CHANGE UP (ref 130–400)
PMV BLD: 11.4 FL — HIGH (ref 7.4–10.4)
POTASSIUM SERPL-MCNC: 4 MMOL/L — SIGNIFICANT CHANGE UP (ref 3.5–5)
POTASSIUM SERPL-SCNC: 4 MMOL/L — SIGNIFICANT CHANGE UP (ref 3.5–5)
PROT SERPL-MCNC: 6.7 G/DL — SIGNIFICANT CHANGE UP (ref 6–8)
RBC # BLD: 4.91 M/UL — SIGNIFICANT CHANGE UP (ref 4.7–6.1)
RBC # FLD: 13.1 % — SIGNIFICANT CHANGE UP (ref 11.5–14.5)
SODIUM SERPL-SCNC: 137 MMOL/L — SIGNIFICANT CHANGE UP (ref 135–146)
WBC # BLD: 6.92 K/UL — SIGNIFICANT CHANGE UP (ref 4.8–10.8)
WBC # FLD AUTO: 6.92 K/UL — SIGNIFICANT CHANGE UP (ref 4.8–10.8)

## 2024-07-15 PROCEDURE — 96374 THER/PROPH/DIAG INJ IV PUSH: CPT | Mod: XU

## 2024-07-15 PROCEDURE — 74177 CT ABD & PELVIS W/CONTRAST: CPT | Mod: 26,MC

## 2024-07-15 PROCEDURE — 36415 COLL VENOUS BLD VENIPUNCTURE: CPT

## 2024-07-15 PROCEDURE — 83690 ASSAY OF LIPASE: CPT

## 2024-07-15 PROCEDURE — 76870 US EXAM SCROTUM: CPT | Mod: 26

## 2024-07-15 PROCEDURE — 74177 CT ABD & PELVIS W/CONTRAST: CPT | Mod: MC

## 2024-07-15 PROCEDURE — 99285 EMERGENCY DEPT VISIT HI MDM: CPT

## 2024-07-15 PROCEDURE — 99284 EMERGENCY DEPT VISIT MOD MDM: CPT | Mod: 25

## 2024-07-15 PROCEDURE — 76870 US EXAM SCROTUM: CPT

## 2024-07-15 PROCEDURE — 96375 TX/PRO/DX INJ NEW DRUG ADDON: CPT

## 2024-07-15 PROCEDURE — 96376 TX/PRO/DX INJ SAME DRUG ADON: CPT

## 2024-07-15 PROCEDURE — 80053 COMPREHEN METABOLIC PANEL: CPT

## 2024-07-15 PROCEDURE — 85025 COMPLETE CBC W/AUTO DIFF WBC: CPT

## 2024-07-15 RX ORDER — SODIUM CHLORIDE 0.9 % (FLUSH) 0.9 %
500 SYRINGE (ML) INJECTION ONCE
Refills: 0 | Status: COMPLETED | OUTPATIENT
Start: 2024-07-15 | End: 2024-07-15

## 2024-07-15 RX ORDER — KETOROLAC TROMETHAMINE 30 MG/ML
1 INJECTION, SOLUTION INTRAMUSCULAR
Qty: 3 | Refills: 0
Start: 2024-07-15 | End: 2024-07-17

## 2024-07-15 RX ORDER — ONDANSETRON HYDROCHLORIDE 2 MG/ML
4 INJECTION INTRAMUSCULAR; INTRAVENOUS ONCE
Refills: 0 | Status: COMPLETED | OUTPATIENT
Start: 2024-07-15 | End: 2024-07-15

## 2024-07-15 RX ORDER — FAMOTIDINE 40 MG
20 TABLET ORAL ONCE
Refills: 0 | Status: COMPLETED | OUTPATIENT
Start: 2024-07-15 | End: 2024-07-15

## 2024-07-15 RX ORDER — IOHEXOL 350 MG/ML
30 INJECTION, SOLUTION INTRAVENOUS ONCE
Refills: 0 | Status: COMPLETED | OUTPATIENT
Start: 2024-07-15 | End: 2024-07-15

## 2024-07-15 RX ORDER — KETOROLAC TROMETHAMINE 30 MG/ML
30 INJECTION, SOLUTION INTRAMUSCULAR ONCE
Refills: 0 | Status: DISCONTINUED | OUTPATIENT
Start: 2024-07-15 | End: 2024-07-15

## 2024-07-15 RX ORDER — MORPHINE SULFATE 100 MG/1
4 TABLET, EXTENDED RELEASE ORAL ONCE
Refills: 0 | Status: DISCONTINUED | OUTPATIENT
Start: 2024-07-15 | End: 2024-07-15

## 2024-07-15 RX ADMIN — KETOROLAC TROMETHAMINE 30 MILLIGRAM(S): 30 INJECTION, SOLUTION INTRAMUSCULAR at 15:48

## 2024-07-15 RX ADMIN — Medication 20 MILLIGRAM(S): at 17:13

## 2024-07-15 RX ADMIN — Medication 500 MILLILITER(S): at 15:48

## 2024-07-15 RX ADMIN — MORPHINE SULFATE 4 MILLIGRAM(S): 100 TABLET, EXTENDED RELEASE ORAL at 19:33

## 2024-07-15 RX ADMIN — IOHEXOL 30 MILLILITER(S): 350 INJECTION, SOLUTION INTRAVENOUS at 15:47

## 2024-07-15 RX ADMIN — MORPHINE SULFATE 4 MILLIGRAM(S): 100 TABLET, EXTENDED RELEASE ORAL at 17:13

## 2024-07-15 RX ADMIN — ONDANSETRON HYDROCHLORIDE 4 MILLIGRAM(S): 2 INJECTION INTRAMUSCULAR; INTRAVENOUS at 17:13
